# Patient Record
Sex: FEMALE | Race: BLACK OR AFRICAN AMERICAN
[De-identification: names, ages, dates, MRNs, and addresses within clinical notes are randomized per-mention and may not be internally consistent; named-entity substitution may affect disease eponyms.]

---

## 2017-03-05 ENCOUNTER — HOSPITAL ENCOUNTER (EMERGENCY)
Dept: HOSPITAL 62 - ER | Age: 27
Discharge: LEFT BEFORE DISCHARGE | End: 2017-03-05
Payer: MEDICAID

## 2017-03-05 VITALS — SYSTOLIC BLOOD PRESSURE: 146 MMHG | DIASTOLIC BLOOD PRESSURE: 82 MMHG

## 2017-03-05 DIAGNOSIS — Z90.49: ICD-10-CM

## 2017-03-05 DIAGNOSIS — F17.200: ICD-10-CM

## 2017-03-05 DIAGNOSIS — R51: Primary | ICD-10-CM

## 2017-03-05 DIAGNOSIS — Z85.41: ICD-10-CM

## 2017-03-05 PROCEDURE — 87070 CULTURE OTHR SPECIMN AEROBIC: CPT

## 2017-03-05 PROCEDURE — 87880 STREP A ASSAY W/OPTIC: CPT

## 2017-03-05 PROCEDURE — 99281 EMR DPT VST MAYX REQ PHY/QHP: CPT

## 2017-03-05 PROCEDURE — 70450 CT HEAD/BRAIN W/O DYE: CPT

## 2017-03-05 NOTE — ER DOCUMENT REPORT
ED Medical Screen (RME)





- General


Chief Complaint: Sinus Congestion


Stated Complaint: SORE THROAT/HEAD PAIN


Time seen by provider: 15:33


Notes: 


Pt complains of fever, sinus congestion, and sore throat for several days.  Has 

noticed white spots to her tonsils.  States history of sinus infections.  Mom 

states she has a history of asthma, but has not used inhaler because she is 

unable to locate it.





I have greeted and performed a rapid initial assessment of this patient.  A 

comprehensive ED assessment and evaluation of the patient, analysis of test 

results and completion of the medical decision making process will be conducted 

by additional ED providers.


TRAVEL OUTSIDE OF THE [place] IN LAST 30 DAYS: No





- Related Data


Allergies/Adverse Reactions: 


 





No Known Allergies Allergy (Verified 03/05/17 15:31)


 











Past Medical History





- Social History


Chew tobacco use (# tins/day): No


Frequency of alcohol use: Occasional


Drug Abuse: None


Family history: Reviewed & Not Pertinent


Pulmonary Medical History: Reports: Hx Asthma


Neurological Medical History: Reports: Hx Migraine


Endocrine Medical History: Denies: Hx Diabetes Mellitus Type 1, Hx Diabetes 

Mellitus Type 2


Renal/ Medical History: Denies: Hx Peritoneal Dialysis


Malignancy Medical History: Reports: Hx Cervical Cancer - pre-cancerous cells 

identified on pap 3 years ago, did not follow up- HPV


GI Medical History: Reports: Hx Gastroesophageal Reflux Disease


Past Surgical History: Reports: Hx Cholecystectomy, Hx Herniorrhaphy, Hx Oral 

Surgery - wisdom teeth





- Immunizations


Hx Diphtheria, Pertussis, Tetanus Vaccination: Yes





Physical Exam





- HEENT


Pharynx: Erythema - mild erythema to throat, no airway compromise..  No: Exudate

## 2017-03-05 NOTE — ER DOCUMENT REPORT
ED General





- General


Chief Complaint: Sinus Congestion


Stated Complaint: SORE THROAT/HEAD PAIN


Information source: Patient


Notes: 


Patient is a 27-year-old female who states every season since she was around 8 

years old, 4 times a year, she develops a sinus infection.  She states when 

this occurs she has pain to her left I.  She denies any obvious blurry vision 

to that eye.  She denies any nausea, vomiting, or neck pain.  Patient states 

she was treated for this around 2 months ago at an outside center.  She does 

state some nasal congestion but denies any nasal discharge.  She denies any 

weakness or numbness to her arms or legs.


TRAVEL OUTSIDE OF THE [place] IN LAST 30 DAYS: No





- HPI


Onset: Other - See above


Onset/Duration: Gradual


Quality of pain: Achy


Severity: Mild


Associated symptoms: Other - See above


Exacerbated by: Denies


Relieved by: Denies


Similar symptoms previously: Yes


Recently seen / treated by doctor: Yes





- Related Data


Allergies/Adverse Reactions: 


 





No Known Allergies Allergy (Verified 03/05/17 15:31)


 











Past Medical History





- General


Information source: Patient





- Social History


Smoking Status: Current Every Day Smoker


Chew tobacco use (# tins/day): No


Frequency of alcohol use: Occasional


Drug Abuse: None


Family History: CAD, Hypertension, Malignancy, Thyroid Disfunction, Other


Patient has suicidal ideation: No


Patient has homicidal ideation: No


Pulmonary Medical History: Reports: Hx Asthma


Neurological Medical History: Reports: Hx Migraine


Endocrine Medical History: Denies: Hx Diabetes Mellitus Type 1, Hx Diabetes 

Mellitus Type 2


Renal/ Medical History: Denies: Hx Peritoneal Dialysis


Malignancy Medical History: Reports: Hx Cervical Cancer - pre-cancerous cells 

identified on pap 3 years ago, did not follow up- HPV


GI Medical History: Reports: Hx Gastroesophageal Reflux Disease


Past Surgical History: Reports: Hx Cholecystectomy, Hx Herniorrhaphy, Hx Oral 

Surgery - wisdom teeth





- Immunizations


Hx Diphtheria, Pertussis, Tetanus Vaccination: Yes





Review of Systems





- Review of Systems


Constitutional: denies: Fever


EENT: Nose congestion, Throat pain.  denies: Eye discharge


Respiratory: denies: Short of breath


Gastrointestinal: denies: Vomiting


Musculoskeletal: denies: Leg swelling


Skin: Other - no hives.  denies: Rash


Neurological/Psychological: Other - no slurred speech.  denies: Confusion, 

Hallucinations, Sensory change, Weakness, Loss of power, Seizure, Lost 

consciousness, Speech impairment, Numbness


-: Yes All other systems reviewed and negative





Physical Exam





- Vital signs


Vitals: 


 











Temp Pulse Resp BP Pulse Ox


 


 99.0 F   100   17   146/82 H  98 


 


 03/05/17 15:30  03/05/17 15:30  03/05/17 15:30  03/05/17 15:30  03/05/17 15:30











Notes: 


Reviewed vital signs and nursing note as charted by RN.





CONSTITUTIONAL: Alert and oriented and responds appropriately to questions. Well

-appearing; well-nourished





HEAD: Normocephalic; atraumatic





EYES: PERRL; using the ophthalmoscope the patient has clear vessels to the left 

eye with a circular sharp disc.  





ENT: Normal nose; no rhinorrhea; moist mucous membranes; pharynx without 

lesions noted





NECK: Supple without meningismus; non-tender; no cervical lymphadenopathy, no 

masses





CARD: Regular rate and rhythm; no murmurs, no clicks, no rubs, no gallops; 

symmetric distal pulses





RESP: Normal chest excursion without splinting or tachypnea; breath sounds 

clear and equal bilaterally; no wheezes, no rhonchi, no rales





EXT: Normal ROM in all joints; non-tender to palpation; no cyanosis, no 

effusions, no edema





SKIN: Normal color for age and race; warm; dry; good turgor; capillary refill < 

2 seconds; no acute lesions noted





NEURO: CN II through XII are intact.  Patient has 5 out of 5 bilateral upper 

and lower extremity strength with sensation intact to light touch.





PSYCH: The patient's mood and manner are appropriate. Grooming and personal 

hygiene are appropriate.





Course





- Re-evaluation


Re-evalutation: 


03/05/17 17:30


Given the history and physical examination with system extensive course, I will 

perform a CT scan of the head as the patient states she has never received 

imaging prior.  I will also check the patient's intraocular pressure with a Orville

-Pen given the frequency of the symptoms.  Patient states she has tried allergy 

medications that have been ineffective.





03/05/17 18:15


CT scan shows no acute intracranial pathology.  Patient still has no focal 

neurological deficits.  Prior to being able to check the patient's Orville-Pen 

pressure which I was attempting to take, the patient left with her baby and her 

daughter.  She did receive the prescription for the antibiotics for her 

daughter.





- Vital Signs


Vital signs: 


 











Temp Pulse Resp BP Pulse Ox


 


 99.0 F   100   17   146/82 H  98 


 


 03/05/17 15:30  03/05/17 15:30  03/05/17 15:30  03/05/17 15:30  03/05/17 15:30














Discharge





- Discharge


Clinical Impression: 


 Frontal headache





Condition: Fair


Disposition: ELOPED

## 2017-04-07 ENCOUNTER — HOSPITAL ENCOUNTER (EMERGENCY)
Dept: HOSPITAL 62 - ER | Age: 27
Discharge: HOME | End: 2017-04-07
Payer: MEDICAID

## 2017-04-07 VITALS — DIASTOLIC BLOOD PRESSURE: 79 MMHG | SYSTOLIC BLOOD PRESSURE: 140 MMHG

## 2017-04-07 DIAGNOSIS — R59.0: Primary | ICD-10-CM

## 2017-04-07 DIAGNOSIS — F17.210: ICD-10-CM

## 2017-04-07 DIAGNOSIS — R03.0: ICD-10-CM

## 2017-04-07 DIAGNOSIS — K13.70: ICD-10-CM

## 2017-04-07 DIAGNOSIS — K21.9: ICD-10-CM

## 2017-04-07 DIAGNOSIS — Z90.49: ICD-10-CM

## 2017-04-07 PROCEDURE — 99283 EMERGENCY DEPT VISIT LOW MDM: CPT

## 2017-04-07 NOTE — ER DOCUMENT REPORT
HPI





- HPI


Patient complains to provider of: left cheek irritation


Onset: Other - 2 days


Onset/Duration: Persistent


Quality of pain: Achy


Severity: Moderate


Pain Level: 3


Context: 


Patient presents emergency department with reports of left cheek irritation for 

the past 2 days with a lymph node swelling.  Denies bites, fever vomiting 

diarrhea.  Reports no history of MRSA.  Patient is wondering if it is a spider 

bite.


Associated Symptoms: None


Exacerbated by: Denies


Relieved by: Denies


Similar symptoms previously: No


Recently seen / treated by doctor: No





- REPRODUCTIVE


LMP: 3-27-17


Reproductive: REPORTS: Pregnant:





- DERM


Skin Color: Normal





Past Medical History





- General


Information source: Patient





- Social History


Smoking Status: Current Every Day Smoker


Cigarette use (# per day): Yes


Drug Abuse: None


Family History: CAD, Hypertension, Malignancy, Thyroid Disfunction, Other


Patient has suicidal ideation: No


Patient has homicidal ideation: No


Pulmonary Medical History: Reports: Hx Asthma


Neurological Medical History: Reports: Hx Migraine


Endocrine Medical History: Denies: Hx Diabetes Mellitus Type 1, Hx Diabetes 

Mellitus Type 2


Renal/ Medical History: Denies: Hx Peritoneal Dialysis


Malignancy Medical History: Reports: Hx Cervical Cancer - pre-cancerous cells 

identified on pap 3 years ago, did not follow up- HPV


GI Medical History: Reports: Hx Gastroesophageal Reflux Disease


Past Surgical History: Reports: Hx Cholecystectomy, Hx Herniorrhaphy, Hx Oral 

Surgery - wisdom teeth





- Immunizations


Hx Diphtheria, Pertussis, Tetanus Vaccination: Yes





Vertical Provider Document





- CONSTITUTIONAL


Agree With Documented VS: Yes


Exam Limitations: No Limitations


General Appearance: WD/WN, No Apparent Distress





- INFECTION CONTROL


TRAVEL OUTSIDE OF THE [place] IN LAST 30 DAYS: No





- HEENT


HEENT: Atraumatic, Normocephalic.  negative: Conjuctival Injection, Pharyngeal 

Erythema


Notes: 


Left cheek with slight redness to the center, no induration no swelling no 

warmth no discharge no pustules/no vesicles, no signs or symptoms of an abscess 

opens mouth wide without problems clear speech





- NECK


Neck: Normal Inspection, Supple, Lymphadenopathy-Left - left cervical lymph node

, shotty





- RESPIRATORY


Respiratory: Breath Sounds Normal, No Respiratory Distress


O2 Sat by Pulse Oximetry: 99





- CARDIOVASCULAR


Cardiovascular: Regular Rate, Regular Rhythm





- GI/ABDOMEN


Gastrointestinal: Abdomen Soft, Abdomen Non-Tender





- MUSCULOSKELETAL/EXTREMETIES


Musculoskeletal/Extremeties: MAEW, FROM





- NEURO


Level of Consciousness: Awake, Alert, Appropriate


Motor/Sensory: No Motor Deficit





- DERM


Integumentary: Warm, Dry


Adult Front & Back Diagram: 


  __________________________














  __________________________





 1 - left cheek irritation





 2 - left lymph node shotty








Course





- Re-evaluation


Re-evalutation: 





04/08/17 


She was instructed on care of the skin irritation.  She was also instructed on 

lymph node swelling.  She was instructed to return emergency department if it 

worsens or turns into an abscess she verbalized understanding to all 

instructions.





- Vital Signs


Vital signs: 


 











Temp Pulse Resp BP Pulse Ox


 


 98.5 F   90   18   140/79 H  99 


 


 04/07/17 20:09  04/07/17 20:09  04/07/17 20:09  04/07/17 20:09  04/07/17 20:09














Discharge





- Discharge


Clinical Impression: 


 left cheek irritation, Left cervical lymphadenopathy, Elevated blood pressure 

reading





Condition: Stable


Disposition: HOME, SELF-CARE


Instructions:  Lymphadenopathy ([facility])


Additional Instructions: 


*You have been evaluated for suspected spider bite, swollen lymph node, 

elevated blood pressure reading


*Monitor the lymph node as discussed, follow up with a primary care provider or 

return to the ED if the lymph node increases in size or if it is not any better 

within one week


*Keep your cheek clean, monitor for signs of infection such as swelling, warmth

, drainage, increased pain


*Follow up with a primary care provider within one week for recheck


*Return to ED for worsening condition, changes, needs








Monitor your blood pressure. Your blood pressure was elevated today.  This may 

be because you were anxious, in pain or because you need medication.  It is 

important to follow up with your primary care provider for full evaluation.


 





Forms:  Return to Work


Referrals: 


MARY ANN LARA MD [Primary Care Provider] - Follow up in 1 week

## 2017-07-25 ENCOUNTER — HOSPITAL ENCOUNTER (OUTPATIENT)
Dept: HOSPITAL 62 - OD | Age: 27
End: 2017-07-25
Attending: NURSE PRACTITIONER
Payer: MEDICAID

## 2017-07-25 DIAGNOSIS — R10.30: Primary | ICD-10-CM

## 2017-07-25 PROCEDURE — 87086 URINE CULTURE/COLONY COUNT: CPT

## 2017-11-13 ENCOUNTER — HOSPITAL ENCOUNTER (EMERGENCY)
Dept: HOSPITAL 62 - ER | Age: 27
Discharge: HOME | End: 2017-11-13
Payer: COMMERCIAL

## 2017-11-13 VITALS — SYSTOLIC BLOOD PRESSURE: 148 MMHG | DIASTOLIC BLOOD PRESSURE: 85 MMHG
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
## 2017-11-13 DIAGNOSIS — F17.200: ICD-10-CM

## 2017-11-13 DIAGNOSIS — J45.909: ICD-10-CM

## 2017-11-13 DIAGNOSIS — M25.511: Primary | ICD-10-CM

## 2017-11-13 DIAGNOSIS — M62.830: ICD-10-CM

## 2017-11-13 PROCEDURE — 99283 EMERGENCY DEPT VISIT LOW MDM: CPT

## 2017-11-13 PROCEDURE — 73030 X-RAY EXAM OF SHOULDER: CPT

## 2017-11-13 PROCEDURE — 96372 THER/PROPH/DIAG INJ SC/IM: CPT

## 2017-11-13 NOTE — RADIOLOGY REPORT (SQ)
EXAM DESCRIPTION:  SHOULDER RIGHT 2 OR MORE VIEWS



COMPLETED DATE/TIME:  11/13/2017 6:48 pm



REASON FOR STUDY:  right shoulder pain



COMPARISON:  None.



NUMBER OF VIEWS:  Three views.



TECHNIQUE:  Internal rotation, external rotation, and Y view images acquired of the right shoulder.



LIMITATIONS:  None.



FINDINGS:  MINERALIZATION: Normal.

BONES: No acute fracture or dislocation.  No worrisome bone lesions.

JOINTS: No dislocation.

VISUALIZED LUNGS AND RIBS: No pneumothorax.  No rib fracture.

SOFT TISSUES: No radiopaque foreign body.

OTHER: No other significant finding.



IMPRESSION:  NEGATIVE STUDY OF THE RIGHT SHOULDER. NO RADIOGRAPHIC EVIDENCE OF ACUTE INJURY.



TECHNICAL DOCUMENTATION:  JOB ID:  [id number]

 2011 [employer]- All Rights Reserved
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
## 2017-11-15 ENCOUNTER — HOSPITAL ENCOUNTER (EMERGENCY)
Dept: HOSPITAL 62 - ER | Age: 27
Discharge: HOME | End: 2017-11-15
Payer: COMMERCIAL

## 2017-11-15 VITALS — SYSTOLIC BLOOD PRESSURE: 136 MMHG | DIASTOLIC BLOOD PRESSURE: 81 MMHG

## 2017-11-15 DIAGNOSIS — F17.200: ICD-10-CM

## 2017-11-15 DIAGNOSIS — S16.1XXA: Primary | ICD-10-CM

## 2017-11-15 DIAGNOSIS — M25.511: ICD-10-CM
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
## 2017-11-15 DIAGNOSIS — M54.2: ICD-10-CM

## 2017-11-15 PROCEDURE — 99283 EMERGENCY DEPT VISIT LOW MDM: CPT
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
## 2018-01-26 ENCOUNTER — HOSPITAL ENCOUNTER (OUTPATIENT)
Dept: HOSPITAL 62 - LAB | Age: 28
End: 2018-01-26
Attending: NURSE PRACTITIONER
Payer: MEDICAID

## 2018-01-26 DIAGNOSIS — R10.9: Primary | ICD-10-CM

## 2018-01-26 LAB
BACTERIA (WET MOUNT): (no result)
CHLAM PCR: NOT DETECTED
EPITHELIALS (WET MOUNT): (no result)
GON PCR: NOT DETECTED
RBCS (WET MOUNT): (no result)
T.VAGINALIS (WET MOUNT): (no result)
WBCS (WET MOUNT): (no result)
YEAST (WET MOUNT): (no result)

## 2018-01-26 PROCEDURE — 87591 N.GONORRHOEAE DNA AMP PROB: CPT

## 2018-01-26 PROCEDURE — 87210 SMEAR WET MOUNT SALINE/INK: CPT

## 2018-01-26 PROCEDURE — 87086 URINE CULTURE/COLONY COUNT: CPT

## 2018-01-26 PROCEDURE — 87491 CHLMYD TRACH DNA AMP PROBE: CPT

## 2018-01-29 ENCOUNTER — HOSPITAL ENCOUNTER (EMERGENCY)
Dept: HOSPITAL 62 - ER | Age: 28
Discharge: HOME | End: 2018-01-29
Payer: MEDICAID

## 2018-01-29 VITALS — DIASTOLIC BLOOD PRESSURE: 78 MMHG | SYSTOLIC BLOOD PRESSURE: 129 MMHG

## 2018-01-29 DIAGNOSIS — R11.0: ICD-10-CM

## 2018-01-29 DIAGNOSIS — R52: ICD-10-CM

## 2018-01-29 DIAGNOSIS — R05: ICD-10-CM

## 2018-01-29 DIAGNOSIS — J45.909: ICD-10-CM

## 2018-01-29 DIAGNOSIS — F17.200: ICD-10-CM

## 2018-01-29 DIAGNOSIS — R09.81: ICD-10-CM

## 2018-01-29 DIAGNOSIS — J34.89: ICD-10-CM

## 2018-01-29 DIAGNOSIS — J06.9: Primary | ICD-10-CM

## 2018-01-29 DIAGNOSIS — B97.89: ICD-10-CM

## 2018-01-29 DIAGNOSIS — N30.90: ICD-10-CM

## 2018-01-29 PROCEDURE — 99283 EMERGENCY DEPT VISIT LOW MDM: CPT

## 2018-01-29 NOTE — ER DOCUMENT REPORT
ED Flu Like





- General


Mode of Arrival: Ambulatory


Information source: Patient


TRAVEL OUTSIDE OF THE [place] IN LAST 30 DAYS: No





- HPI


Onset: Last week





- General


Chief Complaint: Flu Symptoms


Stated Complaint: COUGH/FEVER


Time Seen by Provider: 01/29/18 09:22


Notes: 


Patient is a 28 year old female presenting to the emergency department 

complaining of flu like symptoms including body aches, coughing, rhinorrhea and 

nausea onset 2 days ago. 





Patient states she has recently been diagnosed with a bladder infection and is 

currently taking Metronidazole and Doxycycline. Patient denies vomiting or 

diarrhea. 


 (JIHAN HURTADO)





- Related Data


Allergies/Adverse Reactions: 


 





No Known Allergies Allergy (Verified 11/15/17 11:46)


 











Past Medical History





- General


Information source: Patient





- Social History


Smoking Status: Current Every Day Smoker


Chew tobacco use (# tins/day): No


Frequency of alcohol use: None


Drug Abuse: None


Family History: CAD, Hypertension, Malignancy, Thyroid Disfunction, Other


Patient has suicidal ideation: No


Patient has homicidal ideation: No


Pulmonary Medical History: Reports: Hx Asthma


Neurological Medical History: Reports: Hx Migraine


Malignancy Medical History: Reports: Hx Cervical Cancer - pre-cancerous cells 

identified on pap 3 years ago, did not follow up- HPV


GI Medical History: Reports: Hx Gastroesophageal Reflux Disease


Past Surgical History: Reports: Hx Cholecystectomy, Hx Herniorrhaphy, Hx Oral 

Surgery - wisdom teeth





- Immunizations


Hx Diphtheria, Pertussis, Tetanus Vaccination: Yes





Review of Systems





- Review of Systems


Constitutional: No symptoms reported


EENT: See HPI, Nose congestion


Cardiovascular: No symptoms reported


Respiratory: See HPI, Cough


Gastrointestinal: See HPI, Nausea


Genitourinary: No symptoms reported


Female Genitourinary: No symptoms reported


Musculoskeletal: No symptoms reported


Skin: No symptoms reported


Hematologic/Lymphatic: No symptoms reported


Neurological/Psychological: No symptoms reported


-: Yes All other systems reviewed and negative





Physical Exam





- Vital signs


Vitals: 


 











Temp Pulse Resp BP Pulse Ox


 


 99.4 F   101 H  15   138/80 H  99 


 


 01/29/18 09:05  01/29/18 09:05  01/29/18 09:05  01/29/18 09:05  01/29/18 09:05














- Notes


Notes: 





GENERAL: Alert, interacts well. No acute distress.


HEAD: Normocephalic, atraumatic.


EYES: Pupils equal, round, and reactive to light. Extraocular movements intact.


ENT: Oral mucosa moist, tongue midline. No erythema of the posterior 

oropharynx. 


NECK: Full range of motion. Supple. Trachea midline.


LUNGS: Clear to auscultation bilaterally, no wheezes, rales, or rhonchi. No 

respiratory distress.


HEART: Regular rate and rhythm. No murmurs, gallops, or rubs.


ABDOMEN: Soft, non-tender. Non-distended. Bowel sounds present in all 4 

quadrants.


EXTREMITIES: Moves all 4 extremities spontaneously. 


NEUROLOGICAL: Alert and oriented x3. Normal speech.


PSYCH: Normal affect, normal mood.


SKIN: Warm, dry, normal turgor. No rashes or lesions noted.


 (JIHAN HURTADO)





Course





- Re-evaluation


Re-evalutation: 





01/29/18 10:11


Patient well-appearing in no acute distress.  She is already on antibiotics for 

urinary tract infection.  Instructed to continue antibiotics and obtain Advil 

Cold and Sinus from over-the-counter.  Return precautions provided including 

any worsening of symptoms or not improving to be reevaluated in the next 2-3 

days. (NANCY GALEAS)





- Vital Signs


Vital signs: 


 











Temp Pulse Resp BP Pulse Ox


 


 97.5 F   88   15   129/78 H  100 


 


 01/29/18 10:25  01/29/18 10:25  01/29/18 09:05  01/29/18 10:25  01/29/18 10:25














Discharge





- Discharge


Clinical Impression: 


Upper respiratory infection


Qualifiers:


 URI type: unspecified viral URI Qualified Code(s): J06.9 - Acute upper 

respiratory infection, unspecified





Disposition: HOME, SELF-CARE


Additional Instructions: 


Please purchase Advil Cold and Sinus over-the-counter intake as instructed for 

your upper respiratory symptoms


Forms:  Return to Work


Referrals: 


MARY ANN LARA MD [Primary Care Provider] - Follow up as needed


Scribe Attestation: 





01/30/18 20:06


I personally performed the services described documentation, reviewed and 

edited the documentation which was dictated to describe my presence, and it 

accurately records my words and actions. (NANCY GALEAS)





Scribe Documentation





- Scribe


Written by Scribe:: Guanaco Hurst, 1/29/2018 09:55


acting as scribe for :: Agustin

## 2018-06-05 ENCOUNTER — HOSPITAL ENCOUNTER (OUTPATIENT)
Dept: HOSPITAL 62 - RAD | Age: 28
End: 2018-06-05
Attending: INTERNAL MEDICINE
Payer: SELF-PAY

## 2018-06-05 DIAGNOSIS — M25.561: Primary | ICD-10-CM

## 2018-06-05 NOTE — RADIOLOGY REPORT (SQ)
EXAM DESCRIPTION:  KNEE RIGHT 2 VIEWS



COMPLETED DATE/TIME:  6/5/2018 12:22 pm



REASON FOR STUDY:  PAIN IN RIGHT KNEE M25.561  PAIN IN RIGHT KNEE



COMPARISON:  None.



NUMBER OF VIEWS:  Two views.



TECHNIQUE:  AP and lateral radiographic images acquired of the right knee.



LIMITATIONS:  None.



FINDINGS:  MINERALIZATION: Normal.

BONES: No acute fracture or dislocation. No worrisome bone lesions. No significant osteophytes.

JOINT: No effusion.  No chondrocalcinosis.

OTHER: No other significant finding.



IMPRESSION:  NEGATIVE STUDY OF THE RIGHT KNEE. NO EXPLANATION FOR PAIN.



TECHNICAL DOCUMENTATION:  JOB ID:  [id number]

 2011 [employer]- All Rights Reserved



Reading location - IP/workstation name: [email or web address]

## 2018-06-08 ENCOUNTER — HOSPITAL ENCOUNTER (EMERGENCY)
Dept: HOSPITAL 62 - ER | Age: 28
Discharge: HOME | End: 2018-06-08
Payer: COMMERCIAL

## 2018-06-08 VITALS — SYSTOLIC BLOOD PRESSURE: 121 MMHG | DIASTOLIC BLOOD PRESSURE: 74 MMHG

## 2018-06-08 DIAGNOSIS — Z85.41: ICD-10-CM

## 2018-06-08 DIAGNOSIS — M79.604: Primary | ICD-10-CM

## 2018-06-08 DIAGNOSIS — F17.210: ICD-10-CM

## 2018-06-08 DIAGNOSIS — Z90.49: ICD-10-CM

## 2018-06-08 DIAGNOSIS — M25.561: ICD-10-CM

## 2018-06-08 PROCEDURE — 99284 EMERGENCY DEPT VISIT MOD MDM: CPT

## 2018-06-08 PROCEDURE — 72110 X-RAY EXAM L-2 SPINE 4/>VWS: CPT

## 2018-06-08 PROCEDURE — 82962 GLUCOSE BLOOD TEST: CPT

## 2018-06-08 PROCEDURE — 99406 BEHAV CHNG SMOKING 3-10 MIN: CPT

## 2018-06-08 PROCEDURE — 96372 THER/PROPH/DIAG INJ SC/IM: CPT

## 2018-06-08 PROCEDURE — 73502 X-RAY EXAM HIP UNI 2-3 VIEWS: CPT

## 2018-06-08 NOTE — RADIOLOGY REPORT (SQ)
EXAM DESCRIPTION:  L SPINE WHOLE



COMPLETED DATE/TIME:  6/8/2018 2:12 pm



REASON FOR STUDY:  pain from hip to foot getting worse



COMPARISON:  None.



NUMBER OF VIEWS:  Five views including obliques.



TECHNIQUE:  AP, lateral, oblique, and sacral radiographic images acquired of the lumbar spine.



LIMITATIONS:  None.



FINDINGS:  MINERALIZATION: Normal.

SEGMENTATION: Normal.  No transitional anatomy.

ALIGNMENT: Normal.

VERTEBRAE: Maintained height.  No fracture or worrisome bone lesion.

DISCS: Preserved height.  No significant osteophytes or end plate irregularity.

POSTERIOR ELEMENTS: Pedicles and facets are intact.  No pars defect or posterior arch defects.

HARDWARE: None in the spine.

PARASPINAL SOFT TISSUES: Normal.

PELVIS: Intact as visualized. No fractures or worrisome bone lesions. SI joints intact.

OTHER: No other significant finding.



IMPRESSION:  NORMAL 5 VIEW LUMBAR SPINE.



TECHNICAL DOCUMENTATION:  JOB ID:  [id number]

 2011 [employer]- All Rights Reserved



Reading location - IP/workstation name: [facility]

## 2018-06-08 NOTE — RADIOLOGY REPORT (SQ)
EXAM DESCRIPTION:  HIP RIGHT AP/LATERAL



COMPLETED DATE/TIME:  6/8/2018 2:12 pm



REASON FOR STUDY:  pain from hip to foot getting worse



COMPARISON:  None.



NUMBER OF VIEWS:  Two views.



TECHNIQUE:  AP pelvis and additional frog-leg view of the right hip.



LIMITATIONS:  None.



FINDINGS:  MINERALIZATION: Normal.

RIGHT HIP: No fracture or dislocation.  No worrisome bone lesions. No contour deformity.  No joint sp
ace narrowing.

LEFT HIP: No fracture or dislocation.  No worrisome bone lesions.

PUBIS AND ISCHIUM: No fracture.

PELVIS: No fracture.

SACRUM: No fracture or dislocation. No worrisome bone lesions.

LOWER LUMBAR SPINE: No fracture or dislocation. No worrisome bone lesions.  No significant disc disea
se.

SOFT TISSUES: No findings.

OTHER: No other significant finding.



IMPRESSION:  NEGATIVE STUDY OF THE RIGHT HIP. NO EXPLANATION FOR PAIN.



TECHNICAL DOCUMENTATION:  JOB ID:  [id number]

 2011 [employer]- All Rights Reserved



Reading location - IP/workstation name: [email or web address]

## 2018-06-08 NOTE — ER DOCUMENT REPORT
ED Extremity Problem, Lower





- General


Chief Complaint: Leg Pain


Stated Complaint: LEG PAIN


Time Seen by Provider: 06/08/18 13:09


Mode of Arrival: Ambulatory


Information source: Patient


Notes: 





28-year-old female presents to ED for right knee pain times a year.  She states 

she has been to Dr. Powers and he said that the x-rays did not show any 

source of the pain but he only x-rayed the knee.  He states that Dr. Powers 

would recommend an MRI but she has not had it yet and the pain is continuing to 

educate stand it anymore.  He states he also tolerated with good check for 

thyroid and diabetes but she has not been told any results yet.  I have 

discussed risks with patient and we will get a x-ray of the lumbar spine right 

hip to ensure that that is not the source of her pain because all the way from 

her hip to her foot we will also do an Accu-Chek to ensure that there is no 

elevation and blood sugars so that I can give her a Decadron and Toradol 

injection.  Patient was agreeable to this plan.


TRAVEL OUTSIDE OF THE [place] IN LAST 30 DAYS: No





- HPI


Patient complains to provider of: Pain


Location: Ankle, Hip, Knee, Leg, Thigh


Occurred: Other - A year


Onset/Duration: Persistent


Quality of pain: Burning, Cramping, Sharp


Severity: Severe


Pain Level: 5


Recent injury: No


Associated symptoms: Painful ambulation


Exacerbated by: Hanging down, Movement, Walking


Relieved by: Elevation





- Related Data


Allergies/Adverse Reactions: 


 





No Known Allergies Allergy (Verified 06/08/18 12:55)


 











Past Medical History





- General


Information source: Patient





- Social History


Smoking Status: Current Every Day Smoker


Cigarette use (# per day): Yes - 1/2 ppd


Chew tobacco use (# tins/day): No


Smoking Education Provided: Yes - 4 min


Frequency of alcohol use: None


Drug Abuse: None


Occupation: [employer]


Lives with: Alone


Family History: CAD, Hypertension, Malignancy, Thyroid Disfunction, Other


Patient has suicidal ideation: No


Patient has homicidal ideation: No





- Past Medical History


Cardiac Medical History: Reports: None


Pulmonary Medical History: Reports: Hx Asthma


EENT Medical History: Reports: None


Neurological Medical History: Reports: Hx Migraine


Endocrine Medical History: Reports: None


Renal/ Medical History: Reports: None


Malignancy Medical History: Reports: Hx Cervical Cancer - pre-cancerous cells 

identified on pap 3 years ago, did not follow up- HPV


GI Medical History: Reports: Hx Gastroesophageal Reflux Disease


Musculoskeltal Medical History: Reports Hx Musculoskeletal Deformity


Skin Medical History: Reports None


Psychiatric Medical History: Reports: None


Traumatic Medical History: Reports: None


Infectious Medical History: Reports: None


Past Surgical History: Reports: Hx Cholecystectomy, Hx Herniorrhaphy, Hx Oral 

Surgery - wisdom teeth





- Immunizations


Hx Diphtheria, Pertussis, Tetanus Vaccination: Yes





Review of Systems





- Review of Systems


Constitutional: No symptoms reported


EENT: No symptoms reported


Cardiovascular: No symptoms reported


Respiratory: No symptoms reported


Gastrointestinal: No symptoms reported


Genitourinary: No symptoms reported


Female Genitourinary: No symptoms reported


Musculoskeletal: Other - Right leg pain from hip to foot


Skin: No symptoms reported


Hematologic/Lymphatic: No symptoms reported


Neurological/Psychological: No symptoms reported


-: Yes All other systems reviewed and negative





Physical Exam





- Vital signs


Vitals: 


 











Temp Pulse Resp BP Pulse Ox


 


 99 F   92   16   130/73 H  99 


 


 06/08/18 13:04  06/08/18 13:04  06/08/18 13:04  06/08/18 13:04  06/08/18 13:04











Interpretation: Normal





- General


General appearance: Appears well, Alert





- HEENT


Head: Normocephalic, Atraumatic


Eyes: Normal


Pupils: PERRL





- Respiratory


Respiratory status: No respiratory distress


Chest status: Nontender


Breath sounds: Normal


Chest palpation: Normal





- Cardiovascular


Rhythm: Regular


Heart sounds: Normal auscultation


Murmur: No





- Abdominal


Inspection: Normal


Distension: No distension


Bowel sounds: Normal


Tenderness: Nontender


Organomegaly: No organomegaly





- Back


Back: Normal, Nontender





- Extremities


General upper extremity: Normal inspection, Nontender, Normal color, Normal ROM

, Normal temperature


General lower extremity: Normal inspection, Normal color, Normal ROM, Normal 

temperature, Normal weight bearing.  No: Ashwin's sign


Hip: Tender


Thigh: Tender


Knee: Tender


Calf: Tender


Ankle: Tender


Foot: Tender





- Neurological


Neuro grossly intact: Yes


Cognition: Normal


Orientation: AAOx4


[place] Coma Scale Eye Opening: Spontaneous


[place] Coma Scale Verbal: Oriented


Xu Coma Scale Motor: Obeys Commands


Xu Coma Scale Total: 15


Speech: Normal


Motor strength normal: LUE, RUE, LLE, RLE


Sensory: Normal





- Psychological


Associated symptoms: Normal affect, Normal mood





- Skin


Skin Temperature: Warm


Skin Moisture: Dry


Skin Color: Normal





Course





- Re-evaluation


Re-evalutation: 





06/08/18 15:04


Discussed x-rays with patient.  Patient was treated with Toradol and Decadron 

for her nerve type pain down her right leg.  X-rays were negative for any acute 

deformities.  Patient was instructed to follow-up with her primary doctor.  She 

requested and received crutches for her pain to the leg.


After performing a Medical Screening Examination, I estimate there is LOW risk 

for EXPANDING OR RUPTURED ABDOMINAL AORTIC ANEURYSM, CAUDA EQUINA SYNDROME, 

EPIDURAL MASS LESION, or HERNIATED DISK CAUSING SEVERE SPINAL STENOSIS, thus I 

consider the discharge disposition reasonable.  I have reevaluated this patient 

multiple times and no significant life threatening changes are noted. The 

patient  and I have discussed the diagnosis and risks, and we agree with 

discharging home and close follow-up. We also discussed returning to the 

Emergency Department immediately if new or worsening symptoms occur with the 

understanding that symptoms and presentations can change. We have discussed the 

symptoms which are most concerning (e.g., saddle anesthesia, urinary or bowel 

incontinence or retention, changing or worsening pain) that necessitate 

immediate return.





- Vital Signs


Vital signs: 


 











Temp Pulse Resp BP Pulse Ox


 


 99 F   92   16   130/73 H  99 


 


 06/08/18 13:04  06/08/18 13:04  06/08/18 13:04  06/08/18 13:04  06/08/18 13:04














- Diagnostic Test


Radiology reviewed: Image reviewed, Reports reviewed





Discharge





- Discharge


Clinical Impression: 


Leg pain


Qualifiers:


 Laterality: right Qualified Code(s): M79.604 - Pain in right leg





Condition: Stable


Disposition: HOME, SELF-CARE


Additional Instructions: 


Leg Pain, Nonspecific





     We did not find an obvious cause for your leg pain. There's no sign of 

blood clot, infection, or other serious disease.


     Possible causes of vague leg pain include muscle or joint inflammation, 

disc disease in the lower back, pressure on the nerves in the back, or reduced 

blood flow through the arteries of the leg.


     Rest the leg. Pain can be eased with an antiinflammatory pain medicine 

such as ibuprofen. If the pain involves a small area, a heating pad might help. 


     Call the doctor or return if the leg becomes swollen, weak, discolored, or 

increasingly painful, or if you develop any other significant change in your 

health.





Ibuprofen





     Ibuprofen is an excellent, safe drug for pain control.  In addition, it 

has potent antiinflammatory effects which are beneficial, especially in the 

treatment of injuries, arthritis, or tendonitis. It's best to take ibuprofen 

with food.  Persons with ulcer disease or allergy to aspirin should notify 

their physician of this before taking ibuprofen.


     Take the medication exactly as prescribed.  Don't take additional doses 

unless instructed to do so by your doctor.  If you develop wheezing, shortness 

of breath, hives, faintness, stomach pain, vomiting, or dark black stools, 

return for re-evaluation at once.





Acetaminophen





     Acetaminophen may be taken for pain relief or fever control. It's much 

safer than aspirin, offering a wider range of "safe" dosages.  It is safe 

during pregnancy.  Some brand names are Tylenol, Panadol, Datril, Anacin 3, 

Tempra, and Liquiprin. Acetaminophen can be repeated every four hours.  The 

following are maximum recommended dosages:





WEIGHT         Dose             Drops                  Elixir        Chewable(

80mg)


(LBS.)                            drprs=droppers    tsp=teaspoon


6                 40 mg            .4 ml (1/2)


6-11            80 mg            .8 ml (full)            1/2   tsp           1 

      tab


12-16         120 mg           1 1/2 drprs            3/4   tsp           1 1/2

  tabs


17-23         160 mg             2  drprs              1      tsp           2  

     tabs


24-30         240 mg             3  drprs              1 1/2 tsp           3   

    tabs


30-35         320 mg                                     2       tsp           

4       tabs


36-41         360 mg                                     2 1/4 tsp           4 1

/2  tabs


42-47         400 mg                                     2 1/2 tsp           5 

      tabs


48-53         480 mg                                     3       tsp          6

       tabs


54-59         520 mg                                     3  1/4 tsp          6 1

/2 tabs


60-64         560 mg                                     3  1/2 tsp          7 

     tabs 


65-70         600 mg                                     3  3/4 tsp          7 1

/2 tabs


71-76         640 mg                                     4       tsp           

8      tabs


77-82         720 mg                                     4 1/2  tsp           9

      tabs


83-88         800 mg                                     5       tsp           

10     tabs





>89 pounds or adults          650 mg to 900 mg 





Acetaminophen can be repeated every four hours. Maximum daily dose not to 

exceed 4000 mg.





   These maximum recommended dosages are slightly higher than the dosages 

written on the product container, but these dosages are very safe and well 

below the toxic dosage for acetaminophen.








Ice & Elevation





     Apply ice packs frequently against the painful area.  Many different 

schedules are recommended, such as "20 minutes on, 20 minutes off" or "one hour 

ice, two hours rest."  If you need to work, you may need to go longer between 

ice treatments.  You should plan to have the area ice packed AT LEAST one-

fourth of the time.


     The ice should be applied over the wrap, tape, or splint, or over a layer 

of cloth -- not directly against the skin.  Some ice bags have a built-in cloth 

and can be put directly on the skin.


     Your injured part should be elevated as much as possible over the next 48 

hours.  Try to keep the injury above the level of the heart. Avoid use of the 

injured area.  Elevation and rest will decrease the swelling.





FOLLOW-UP CARE:


If you have been referred to a physician for follow-up care, call the physician

s office for an appointment as you were instructed or within the next two days.

  If you experience worsening or a significant change in your symptoms, notify 

the physician immediately or return to the Emergency Department at any time for 

re-evaluation.


Forms:  Elevated Blood Pressure, Smoking Cessation Education, Return to Work


Referrals: 


MARY ANN LARA MD [Primary Care Provider] - Follow up as needed

## 2019-02-17 ENCOUNTER — HOSPITAL ENCOUNTER (EMERGENCY)
Dept: HOSPITAL 62 - ER | Age: 29
Discharge: HOME | End: 2019-02-17
Payer: SELF-PAY

## 2019-02-17 VITALS — DIASTOLIC BLOOD PRESSURE: 80 MMHG | SYSTOLIC BLOOD PRESSURE: 140 MMHG

## 2019-02-17 DIAGNOSIS — O99.331: ICD-10-CM

## 2019-02-17 DIAGNOSIS — O46.91: Primary | ICD-10-CM

## 2019-02-17 DIAGNOSIS — J45.909: ICD-10-CM

## 2019-02-17 DIAGNOSIS — O99.511: ICD-10-CM

## 2019-02-17 DIAGNOSIS — Z3A.01: ICD-10-CM

## 2019-02-17 LAB
ADD MANUAL DIFF: NO
BASOPHILS # BLD AUTO: 0.1 10^3/UL (ref 0–0.2)
BASOPHILS NFR BLD AUTO: 0.7 % (ref 0–2)
EOSINOPHIL # BLD AUTO: 0.3 10^3/UL (ref 0–0.6)
EOSINOPHIL NFR BLD AUTO: 3.1 % (ref 0–6)
ERYTHROCYTE [DISTWIDTH] IN BLOOD BY AUTOMATED COUNT: 17 % (ref 11.5–14)
HCT VFR BLD CALC: 34.1 % (ref 36–47)
HGB BLD-MCNC: 11.2 G/DL (ref 12–15.5)
LYMPHOCYTES # BLD AUTO: 2.6 10^3/UL (ref 0.5–4.7)
LYMPHOCYTES NFR BLD AUTO: 27.3 % (ref 13–45)
MCH RBC QN AUTO: 25.5 PG (ref 27–33.4)
MCHC RBC AUTO-ENTMCNC: 33 G/DL (ref 32–36)
MCV RBC AUTO: 77 FL (ref 80–97)
MONOCYTES # BLD AUTO: 0.5 10^3/UL (ref 0.1–1.4)
MONOCYTES NFR BLD AUTO: 5.4 % (ref 3–13)
NEUTROPHILS # BLD AUTO: 6.1 10^3/UL (ref 1.7–8.2)
NEUTS SEG NFR BLD AUTO: 63.5 % (ref 42–78)
PLATELET # BLD: 297 10^3/UL (ref 150–450)
RBC # BLD AUTO: 4.42 10^6/UL (ref 3.72–5.28)
TOTAL CELLS COUNTED % (AUTO): 100 %
WBC # BLD AUTO: 9.6 10^3/UL (ref 4–10.5)

## 2019-02-17 PROCEDURE — 76817 TRANSVAGINAL US OBSTETRIC: CPT

## 2019-02-17 PROCEDURE — 36415 COLL VENOUS BLD VENIPUNCTURE: CPT

## 2019-02-17 PROCEDURE — 84703 CHORIONIC GONADOTROPIN ASSAY: CPT

## 2019-02-17 PROCEDURE — 84702 CHORIONIC GONADOTROPIN TEST: CPT

## 2019-02-17 PROCEDURE — 85025 COMPLETE CBC W/AUTO DIFF WBC: CPT

## 2019-02-17 PROCEDURE — 99284 EMERGENCY DEPT VISIT MOD MDM: CPT

## 2019-02-17 NOTE — ER DOCUMENT REPORT
ED General





- General


Chief Complaint: Vag Bleeding, +preg <12wks


Stated Complaint: ABDOMINAL PAIN


Time Seen by Provider: 19 20:51


Primary Care Provider: 


[facility] [Provider Group] - 19


Notes: 





Patient is a  29-year-old female who presents to the emergency department 

with a chief complaint of vaginal bleeding.  She found out she was pregnant 4 

days ago with a home pregnancy test.  Yesterday she started to have some 

spotting.  Today a few hours before arrival to the emergency department, she 

started to have some more spotting.  She denies any abdominal pain.  She is 

sexually active she denies any vaginal discharge other than blood.  Her last 

menstrual cycle was 4 weeks ago.


TRAVEL OUTSIDE OF THE [place] IN LAST 30 DAYS: No





- Related Data


Allergies/Adverse Reactions: 


                                        





No Known Allergies Allergy (Verified 18 12:55)


   











Past Medical History





- Social History


Smoking Status: Current Every Day Smoker


Frequency of alcohol use: Rare


Drug Abuse: None


Family History: CAD, Hypertension, Malignancy, Thyroid Disfunction, Other


Pulmonary Medical History: Reports: Hx Asthma


Neurological Medical History: Reports: Hx Migraine


Endocrine Medical History: Denies: Hx Diabetes Mellitus Type 1, Hx Diabetes Me

llitus Type 2


Renal/ Medical History: Denies: Hx Peritoneal Dialysis


Malignancy Medical History: Reports: Hx Cervical Cancer - pre-cancerous cells i

dentified on pap 3 years ago, did not follow up- HPV


GI Medical History: Reports: Hx Gastroesophageal Reflux Disease


Musculoskeletal Medical History: Reports Hx Musculoskeletal Deformity


Past Surgical History: Reports: Hx Cholecystectomy, Hx Herniorrhaphy, Hx Oral 

Surgery - wisdom teeth





- Immunizations


Hx Diphtheria, Pertussis, Tetanus Vaccination: Yes





Review of Systems





- Review of Systems


Notes: 





REVIEW OF SYSTEMS:





CONSTITUTIONAL :    Denies recent illness.  Denies recent unintentional weight 

loss.  Denies fever,  chills, or sweats. 


EENT: Denies eye, ear, throat, or mouth pain, discharge, or symptoms.  Denies 

nasal or sinus congestion.


CARDIOVASCULAR:  Denies chest pain.


RESPIRATORY: Denies shortness of breath, cough, congestion, difficulty 

breathing, or wheezing. 


GASTROINTESTINAL: Denies nausea, vomiting, and diarrhea.  Denies abdominal pain.

 Denies constipation.  Last BM: Today


GENITOURINARY:  Denies difficulty urinating, burning, blood in urine, urgency or

frequency.


FEMALE  GENITOURINARY: See HPI


MUSCULOSKELETAL:  Denies neck and back pain.  Denies joint pain or swelling.


SKIN:   Denies rash, itchiness, or lesions


HEMATOLOGIC :   Denies easy bruising or bleeding.


LYMPHATIC:  Denies swollen, painful, enlarged glands.


NEUROLOGICAL: Denies no numbness or tingling denies weakness.  Denies headache. 

Denies altered mental status.  Denies alteration in speech.


PSYCHIATRIC:  Denies stress, anxiety, alteration in sleep patterns, or 

depression.





All other systems reviewed and negative.





Physical Exam





- Vital signs


Vitals: 


                                        











Temp Pulse Resp BP Pulse Ox


 


 99.5 F   101 H  16   140/80 H  100 


 


 19 19:30  19 19:30  19 19:30  19 19:30  19 19:30














- Notes


Notes: 





PHYSICAL EXAMINATION:





GENERAL: Appears well, healthy, well-nourished, no acute distress. 





HEAD:  Normocephalic, atraumatic.





EYES:  PERRL, conjunctiva normal, all extraocular movements intact, sclera 

nonicteric





ENT:  Moist mucous membranes. 





NECK: Supple, no noticeable swelling, redness, rash.  Normal range of motion.





LUNGS: Equal breath sounds bilaterally and clear to auscultation.  No wheezes 

rales or rhonchi.





CARDIOVASCULAR: S1-S2, regular rate, regular rhythm.  Radial pulses 2+, normal.





ABDOMEN: Normoactive bowel sounds.  Soft, nontender,  no guarding, no rebound 

tenderness, and no masses palpated.





EXTREMITIES: Normal strength and range of motion, no pitting or edema.  No 

cyanosis. 





NEUROLOGICAL: Moves all extremities upon command.  Strength 5/5 in all 

extremities. 





PSYCH: Normal mood, normal affect.





SKIN: Warm, dry.  No rash, lesions, ulcerations noted.  Normal skin turgor.





Course





- Re-evaluation


Re-evalutation: 


Based off the patient's history and physical exam, I am have a very low 

suspicion of an ovarian torsion, ectopic pregnancy, PID, or any other life-

threatening pelvic etiology. The patient's ultrasound does not show any 

intrauterine uterine pregnancy at this time, most likely because she is only 4 

weeks pregnant.  Her hCG is positive and her quantitative hCG is 603.  I have 

discussed the lab results and the patient's ultrasound with the patient.  She 

will follow-up with [facility] in 48-72 hours to check her 

hCG levels.  She is O+, no indication for RhoGam.  Verbal discharge instructions

were given to the patient.  They verbalized understanding.  They are stable for 

discharge.























- Vital Signs


Vital signs: 


                                        











Temp Pulse Resp BP Pulse Ox


 


 99.5 F   101 H  16   140/80 H  100 


 


 19 19:30  19 19:30  19 19:30  19 19:30  19 19:30














- Laboratory


Result Diagrams: 


                                 19 21:15





Laboratory results interpreted by me: 


                                        











  19





  21:15 21:15 21:15


 


Hgb  11.2 L  


 


Hct  34.1 L  


 


MCV  77 L  


 


MCH  25.5 L  


 


RDW  17.0 H  


 


Serum HCG, Qual   POSITIVE H 


 


Beta HCG, Quant    603.38 H














Discharge





- Discharge


Clinical Impression: 


 Vaginal bleeding during pregnancy





Condition: Stable


Disposition: HOME, SELF-CARE


Additional Instructions: 


You were seen today in the emergency department for vaginal bleeding while 

pregnant.  Your labs show that you are still pregnant.  Please follow-up with 

[facility] in 48-72 hours to have your labs redrawn.  You are

to be on strict pelvic precautions, meaning no sex or anything in your vagina 

until you are cleared by the OB/GYN.  If you have worsening bleeding, develop a 

fever greater than 100.4 F, have abdominal pain, or have any symptoms that are 

worrisome to you, please return to the emergency department.


Referrals: 


[facility] [Provider [facility]] - 19

## 2019-02-17 NOTE — RADIOLOGY REPORT (SQ)
EXAM DESCRIPTION:

US PREGNANCY TRANSVAGINAL



COMPLETED DATE/TME:  02/17/2019 20:58



CLINICAL HISTORY:

29 years Female, vaginal bleeding; 4 wks pregnant 



COMPARISON:

None



TECHNIQUE: Transvaginal.



LIMITATIONS: None.



FINDINGS:



No definite intrauterine gestation confirmed. Possible 0.4 cm

intrauterine gestational sac/yolk sac which of viable measures

five weeks and one day based on mean sac diameter of 0.4 cm.



4.3 cm right ovary, 2.6 cm left ovary, no significant free fluid,

possible 1.2 cm right corpus luteal cyst. 



IMPRESSION:



No intrauterine pregnancy confirmed. Differential diagnosis

includes early viable intrauterine gestation, gestational loss,

and occult ECTOPIC gestation. Recommend 48 -72 hours

laboratory/sonographic surveillance.

## 2019-02-18 ENCOUNTER — HOSPITAL ENCOUNTER (EMERGENCY)
Dept: HOSPITAL 62 - ER | Age: 29
Discharge: HOME | End: 2019-02-18
Payer: COMMERCIAL

## 2019-02-18 VITALS — SYSTOLIC BLOOD PRESSURE: 136 MMHG | DIASTOLIC BLOOD PRESSURE: 81 MMHG

## 2019-02-18 DIAGNOSIS — O99.331: ICD-10-CM

## 2019-02-18 DIAGNOSIS — R10.2: ICD-10-CM

## 2019-02-18 DIAGNOSIS — F17.210: ICD-10-CM

## 2019-02-18 DIAGNOSIS — Z3A.00: ICD-10-CM

## 2019-02-18 DIAGNOSIS — O26.891: ICD-10-CM

## 2019-02-18 DIAGNOSIS — J45.909: ICD-10-CM

## 2019-02-18 DIAGNOSIS — O20.9: Primary | ICD-10-CM

## 2019-02-18 DIAGNOSIS — O99.511: ICD-10-CM

## 2019-02-18 PROCEDURE — 36415 COLL VENOUS BLD VENIPUNCTURE: CPT

## 2019-02-18 PROCEDURE — 99406 BEHAV CHNG SMOKING 3-10 MIN: CPT

## 2019-02-18 PROCEDURE — 84702 CHORIONIC GONADOTROPIN TEST: CPT

## 2019-02-18 PROCEDURE — 99284 EMERGENCY DEPT VISIT MOD MDM: CPT

## 2019-02-18 NOTE — ER DOCUMENT REPORT
Addendum entered and electronically signed by NATHANIEL WEINSTEIN NP  

02/20/19 12:15: 








Discharge





- Discharge


Clinical Impression: 


 Vaginal bleeding during pregnancy





Condition: Stable


Disposition: HOME, SELF-CARE


Additional Instructions: 


As I have discussed with you you could possibly have be having a miscarriage or 

it could be a very early pregnancy.  You hCG went from 608-617.  Normally it 

should have gone up higher than this.  We will repeat the serum hCG quant in 48 

hours she is a blood test you will come in Wednesday afternoon after you get off

work and have drawn.  


As the results will not come back until after I have left I will call you the 

results on Thursday as long as you leave me a good number that I can call you 

Thursday morning.





PREGNANCY:





     You are pregnant.  Prenatal care is best started as early in pregnancy as 

possible.


     If you're unsure about continuing this pregnancy, you should discuss this 

with your physician or with counsellors at [facility].


     You should take only medications approved by your physician. Acetaminophen 

can safely be taken for minor pains.  As a rule, medication for chronic 

conditions such as asthma or seizures can safely be continued.  You should 

discuss with the physician every medicine you take.


     Any regular exercise program can be continued.  Talk to your physician, 

however, before engaging in competitive or demanding sports.


     Alcohol, smoking, and "street drugs" are dangerous to your baby. Cocaine is

especially dangerous.  Don't use any illicit drugs!








BLEEDING DURING EARLY PREGNANCY:





     You have been evaluated for passing blood while pregnant. While we take 

this symptom very seriously, most women with your degree of bleeding will go on 

to have a perfectly normal baby. At this time, there is no indication that a 

miscarriage will occur. (A miscarriage occurs when the fetus is abnormal.  There

is no medicine or treatment to prevent it.)


     A more serious cause of bleeding is tubal (or ectopic) pregnancy. An u

ltrasound usually can show whether the pregnancy is in the uterus or in the 

tube. Sometimes in early pregnancy, no fetus is seen. In this case, careful 

follow-up, including repeat blood tests and repeat ultrasound, is necessary.


     Do not douche or have sex for at least a week, or until OK'd by the doctor.

Don't use tampons.


     Call the doctor or return for re-examination if there is an increase in 

bleeding or cramping, extreme weakness, fainting, new abdominal pain, fever, or 

passage of tissue.





REPEAT BLOOD TEST:


     At this time, it is uncertain if you have a viable pregnancy.  During the 

first three months of pregnancy, the pregnancy hormone produced from the 

placenta will steadily rise, usually doubling in value every 2 - 3 days.  In 

order to determine if your pregnancy is viable and likely be succesful, a repeat

of this blood test for the pregnancy hormone is recommended in 2 - 3 days.  An 

order for this test to be done as an outpatient is being provided.  After you 

have this repeat test done, call your doctor or call us for the results.  If the

value of the test is increasing as would be expected in a normal pregnancy, then

your pregnancy is likely to be ok.  However, if the value of the test is 

declining, it will suggest something has happened with your pregnancy and it 

will not likely be a successful pregnancy.








FOLLOW-UP CARE:


If you have been referred to a physician for follow-up care, call the 

physicians office for an appointment as you were instructed or within the next 

two days.  If you experience worsening or a significant change in your symptoms 

(very heavy bleeding with large clots of blood, passage of tissue, more severe 

abdominal / pelvic pain or cramping, feeling faint or severe weakness, fever, 

etc.), notify the physician immediately or return to the Emergency Department at

any time for re-evaluation.





[facility] (OB-[facility]N) [facility] IN [place]:





[facility]


    [address] [place] [address]


    [phone]





Forms:  Follow-Up Laboratory Testing, Return to Work


Referrals: 


MARY ANN LARA MD [Primary Care Provider] - Follow up as needed





Original Note:








ED GI/





- General


Chief Complaint: Vag Bleeding, +preg <12wks


Stated Complaint: CRAMPING/BLEEDING


Time Seen by Provider: 02/18/19 20:26


Primary Care Provider: 


MARY ANN LARA MD [Primary Care Provider] - Follow up as needed


Mode of Arrival: Wheelchair


Information source: Patient


Notes: 





29-year-old female presents to ED for complaint of lower abdominal pain cramping

and bleeding.  She was seen last night for the same symptoms.  She states this 

is her fourth pregnancy.  She has 3 live children.  She states the cramping is 

bad at this time.  She states she has been bleeding yesterday the bleeding 

stopped and then started again today.  She states she is passing clots.


TRAVEL OUTSIDE OF THE [place] IN LAST 30 DAYS: No





- HPI


Patient complains to provider of: Pregnant, Vaginal bleeding


Onset: Yesterday


Timing/Duration: Intermittent


Quality of pain: Cramping


Severity at maximum: Moderate


Severity in ED: Moderate


Pain Level: 3


Location: Pelvis


Vaginal bleeding (Compared to normal period): Heavier, Passing clots


Associated symptoms: Other


Exacerbated by: Denies


Relieved by: Denies - Vaginal bleeding


Similar symptoms previously: Yes


Recently seen / treated by doctor: Yes





- Related Data


Allergies/Adverse Reactions: 


                                        





No Known Allergies Allergy (Verified 06/08/18 12:55)


   











Past Medical History





- General


Information source: Patient





- Social History


Smoking Status: Current Every Day Smoker


Cigarette use (# per day): Yes - 2-5 cig a day


Smoking Education Provided: Yes - 4 min


Frequency of alcohol use: None


Drug Abuse: None


Family History: CAD, Hypertension, Malignancy, Thyroid Disfunction, Other


Patient has suicidal ideation: No


Patient has homicidal ideation: No





- Past Medical History


Cardiac Medical History: Reports: None


Pulmonary Medical History: Reports: Hx Asthma


EENT Medical History: Reports: None


Neurological Medical History: Reports: Hx Migraine


Endocrine Medical History: Reports: None


Renal/ Medical History: Reports: None


Malignancy Medical History: Reports: Hx Cervical Cancer - pre-cancerous cells 

identified on pap 3 years ago, did not follow up- HPV


GI Medical History: Reports: Hx Gastroesophageal Reflux Disease


Musculoskeletal Medical History: Reports Hx Musculoskeletal Deformity


Skin Medical History: Reports None


Psychiatric Medical History: Reports: None


Traumatic Medical History: Reports: None


Infectious Medical History: Reports: None


Past Surgical History: Reports: Hx Cholecystectomy, Hx Herniorrhaphy, Hx Oral 

Surgery - wisdom teeth





- Immunizations


Immunizations up to date: Yes


Hx Diphtheria, Pertussis, Tetanus Vaccination: Yes





Review of Systems





- Review of Systems


Constitutional: No symptoms reported


EENT: No symptoms reported


Cardiovascular: No symptoms reported


Respiratory: No symptoms reported


Gastrointestinal: No symptoms reported


Genitourinary: No symptoms reported


Female Genitourinary: Pregnant, Vaginal bleeding, Other - Pelvic cramping


Musculoskeletal: No symptoms reported


Skin: No symptoms reported


Hematologic/Lymphatic: No symptoms reported


Neurological/Psychological: No symptoms reported


-: Yes All other systems reviewed and negative





Physical Exam





- Vital signs


Vitals: 


                                        











Temp Pulse Resp BP Pulse Ox


 


 98.8 F   99   16   136/81 H  99 


 


 02/18/19 18:45  02/18/19 18:45  02/18/19 18:45  02/18/19 18:45  02/18/19 18:45











Interpretation: Normal





- General


General appearance: Appears well, Alert





- HEENT


Head: Normocephalic, Atraumatic


Eyes: Normal


Pupils: PERRL





- Respiratory


Respiratory status: No respiratory distress


Chest status: Nontender


Breath sounds: Normal


Chest palpation: Normal





- Cardiovascular


Rhythm: Regular


Heart sounds: Normal auscultation


Murmur: No





- Abdominal


Inspection: Normal


Distension: No distension


Bowel sounds: Normal


Tenderness: Tender


Organomegaly: No organomegaly





- Back


Back: Normal, Nontender





- Extremities


General upper extremity: Normal inspection, Nontender, Normal color, Normal ROM,

Normal temperature


General lower extremity: Normal inspection, Nontender, Normal color, Normal ROM,

Normal temperature, Normal weight bearing.  No: Ashwin's sign





- Neurological


Neuro grossly intact: Yes


Cognition: Normal


Orientation: AAOx4


Xu Coma Scale Eye Opening: Spontaneous


Xu Coma Scale Verbal: Oriented


Penfield Coma Scale Motor: Obeys Commands


Penfield Coma Scale Total: 15


Speech: Normal


Motor strength normal: LUE, RUE, LLE, RLE


Sensory: Normal





- Psychological


Associated symptoms: Normal affect, Normal mood





- Skin


Skin Temperature: Warm


Skin Moisture: Dry


Skin Color: Normal





Course





- Re-evaluation


Re-evalutation: 





02/18/19 21:59


Results of hCG were discussed with patient.  Patient was given a lab request for

a repeat hCG in 48 hours.  Patient was given instructions to call [phone] on 

Thursday morning for the results of her hCG.  Patient verbalized understanding 

and agreement with this treatment plan.  Patient was discharged home





- Vital Signs


Vital signs: 


                                        











Temp Pulse Resp BP Pulse Ox


 


 98.8 F   99   16   136/81 H  99 


 


 02/18/19 18:45  02/18/19 18:45  02/18/19 18:45  02/18/19 18:45  02/18/19 18:45














- Laboratory


Laboratory results interpreted by me: 


                                        











  02/18/19





  20:35


 


Beta HCG, Quant  617.03 H














Discharge





- Discharge


Clinical Impression: 


 Vaginal bleeding during pregnancy





Condition: Stable


Disposition: HOME, SELF-CARE


Additional Instructions: 


As I have discussed with you you could possibly have be having a miscarriage or 

it could be a very early pregnancy.  You hCG went from 608-617.  Normally it 

should have gone up higher than this.  We will repeat the serum hCG quant in 48 

hours she is a blood test you will come in Wednesday afternoon after you get off

work and have drawn.  


As the results will not come back until after I have left I will call you the 

results on Thursday as long as you leave me a good number that I can call you 

Thursday morning.





PREGNANCY:





     You are pregnant.  Prenatal care is best started as early in pregnancy as 

possible.


     If you're unsure about continuing this pregnancy, you should discuss this 

with your physician or with counsellors at [facility].


     You should take only medications approved by your physician. Acetaminophen 

can safely be taken for minor pains.  As a rule, medication for chronic 

conditions such as asthma or seizures can safely be continued.  You should 

discuss with the physician every medicine you take.


     Any regular exercise program can be continued.  Talk to your physician, 

however, before engaging in competitive or demanding sports.


     Alcohol, smoking, and "street drugs" are dangerous to your baby. Cocaine is

especially dangerous.  Don't use any illicit drugs!








BLEEDING DURING EARLY PREGNANCY:





     You have been evaluated for passing blood while pregnant. While we take thi

s symptom very seriously, most women with your degree of bleeding will go on to 

have a perfectly normal baby. At this time, there is no indication that a 

miscarriage will occur. (A miscarriage occurs when the fetus is abnormal.  There

is no medicine or treatment to prevent it.)


     A more serious cause of bleeding is tubal (or ectopic) pregnancy. An 

ultrasound usually can show whether the pregnancy is in the uterus or in the 

tube. Sometimes in early pregnancy, no fetus is seen. In this case, careful 

follow-up, including repeat blood tests and repeat ultrasound, is necessary.


     Do not douche or have sex for at least a week, or until OK'd by the doctor.

Don't use tampons.


     Call the doctor or return for re-examination if there is an increase in 

bleeding or cramping, extreme weakness, fainting, new abdominal pain, fever, or 

passage of tissue.





REPEAT BLOOD TEST:


     At this time, it is uncertain if you have a viable pregnancy.  During the 

first three months of pregnancy, the pregnancy hormone produced from the 

placenta will steadily rise, usually doubling in value every 2 - 3 days.  In 

order to determine if your pregnancy is viable and likely be succesful, a repeat

of this blood test for the pregnancy hormone is recommended in 2 - 3 days.  An 

order for this test to be done as an outpatient is being provided.  After you 

have this repeat test done, call your doctor or call us for the results.  If the

value of the test is increasing as would be expected in a normal pregnancy, then

your pregnancy is likely to be ok.  However, if the value of the test is 

declining, it will suggest something has happened with your pregnancy and it 

will not likely be a successful pregnancy.








FOLLOW-UP CARE:


If you have been referred to a physician for follow-up care, call the 

physicians office for an appointment as you were instructed or within the next 

two days.  If you experience worsening or a significant change in your symptoms 

(very heavy bleeding with large clots of blood, passage of tissue, more severe 

abdominal / pelvic pain or cramping, feeling faint or severe weakness, fever, 

etc.), notify the physician immediately or return to the Emergency Department at

any time for re-evaluation.





[facility] (OB-GYN) PHYSICIANS IN [place]:





[facility]


    [address] [place] [address]


    [phone]





Forms:  Follow-Up Laboratory Testing


Referrals: 


MARY ANN LARA MD [Primary Care Provider] - Follow up as needed

## 2019-02-20 ENCOUNTER — HOSPITAL ENCOUNTER (OUTPATIENT)
Dept: HOSPITAL 62 - LAB | Age: 29
End: 2019-02-20
Attending: NURSE PRACTITIONER
Payer: COMMERCIAL

## 2019-02-20 DIAGNOSIS — N93.9: Primary | ICD-10-CM

## 2019-02-20 PROCEDURE — 36415 COLL VENOUS BLD VENIPUNCTURE: CPT

## 2019-02-20 PROCEDURE — 84702 CHORIONIC GONADOTROPIN TEST: CPT

## 2019-03-19 ENCOUNTER — HOSPITAL ENCOUNTER (EMERGENCY)
Dept: HOSPITAL 62 - ER | Age: 29
Discharge: HOME | End: 2019-03-19
Payer: COMMERCIAL

## 2019-03-19 VITALS — SYSTOLIC BLOOD PRESSURE: 106 MMHG | DIASTOLIC BLOOD PRESSURE: 59 MMHG

## 2019-03-19 DIAGNOSIS — R50.9: ICD-10-CM

## 2019-03-19 DIAGNOSIS — F17.200: ICD-10-CM

## 2019-03-19 DIAGNOSIS — R00.0: ICD-10-CM

## 2019-03-19 DIAGNOSIS — Z20.818: ICD-10-CM

## 2019-03-19 DIAGNOSIS — J45.909: ICD-10-CM

## 2019-03-19 DIAGNOSIS — R07.9: ICD-10-CM

## 2019-03-19 DIAGNOSIS — J02.9: ICD-10-CM

## 2019-03-19 DIAGNOSIS — R05: Primary | ICD-10-CM

## 2019-03-19 LAB
A TYPE INFLUENZA AG: NEGATIVE
B INFLUENZA AG: NEGATIVE

## 2019-03-19 PROCEDURE — 87070 CULTURE OTHR SPECIMN AEROBIC: CPT

## 2019-03-19 PROCEDURE — 96374 THER/PROPH/DIAG INJ IV PUSH: CPT

## 2019-03-19 PROCEDURE — 87804 INFLUENZA ASSAY W/OPTIC: CPT

## 2019-03-19 PROCEDURE — 99283 EMERGENCY DEPT VISIT LOW MDM: CPT

## 2019-03-19 PROCEDURE — 71046 X-RAY EXAM CHEST 2 VIEWS: CPT

## 2019-03-19 PROCEDURE — 96361 HYDRATE IV INFUSION ADD-ON: CPT

## 2019-03-19 PROCEDURE — 87880 STREP A ASSAY W/OPTIC: CPT

## 2019-03-19 NOTE — RADIOLOGY REPORT (SQ)
EXAM DESCRIPTION: 



XR CHEST 2 VIEWS



COMPLETED DATE/TME:  03/19/2019 04:20



CLINICAL HISTORY: 



29 years, Female, cough, fever



Comparison: None 



FINDINGS:

No focal lung consolidation.

No pleural effusion. No pneumothorax.

Cardiac and mediastinal silhouette is unremarkable.

No acute osseous abnormality. 

Soft tissues are unremarkable. 



IMPRESSION:

No acute findings. No focal lung consolidation.

## 2019-03-19 NOTE — ER DOCUMENT REPORT
ED General





- General


Chief Complaint: Flu Symptoms


Stated Complaint: FLU SYMPTOMS


Time Seen by Provider: 03/19/19 04:14


Primary Care Provider: 


NATHANIEL WEINSTEIN NP [NURSE PRACTITIONER] - Follow up as needed


Notes: 





Patient is a pleasant 29-year-old female presents with complaint of having 

fever, body aches, cough and congestion.  Symptoms started yesterday.  She says 

that her daughter and her daughter's godmother recent diagnosed with strep and 

are currently undergoing treatment for strep throat.  She denies any abdominal 

pain.  No vomiting.  No diarrhea.  She says she just hurts all over.  She has no

chronic medical problems.  She is not taking medications.  She does smoke.  She 

does not drink or do drugs.


TRAVEL OUTSIDE OF THE [place] IN LAST 30 DAYS: No





- Related Data


Allergies/Adverse Reactions: 


                                        





No Known Allergies Allergy (Verified 06/08/18 12:55)


   











Past Medical History





- Social History


Smoking Status: Current Every Day Smoker


Frequency of alcohol use: None


Drug Abuse: None


Family History: CAD, Hypertension, Malignancy, Thyroid Disfunction, Other


Pulmonary Medical History: Reports: Hx Asthma


Neurological Medical History: Reports: Hx Migraine


Endocrine Medical History: Denies: Hx Diabetes Mellitus Type 1, Hx Diabetes 

Mellitus Type 2


Renal/ Medical History: Denies: Hx Peritoneal Dialysis


Malignancy Medical History: Reports: Hx Cervical Cancer - pre-cancerous cells 

identified on pap 3 years ago, did not follow up- HPV


GI Medical History: Reports: Hx Gastroesophageal Reflux Disease


Musculoskeletal Medical History: Reports Hx Musculoskeletal Deformity


Past Surgical History: Reports: Hx Cholecystectomy, Hx Herniorrhaphy, Hx Oral 

Surgery - wisdom teeth





- Immunizations


Immunizations up to date: Yes


Hx Diphtheria, Pertussis, Tetanus Vaccination: Yes





Review of Systems





- Review of Systems


Notes: 





My Normal Review Basic





REVIEW OF SYSTEMS:


CONSTITUTIONAL : Fever


EENT:   Congestion


CARDIOVASCULAR: Pain over left side of chest with coughing


RESPIRATORY: Cough


GASTROINTESTINAL:  Denies abdominal pain.  Denies nausea, vomiting, or diarrhea.

 


GENITOURINARY:  Denies difficulty urinating, painful urination, burning, 

frequency, or blood in urine.


FEMALE  GENITOURINARY:  Denies vaginal bleeding, abnormal or irregular periods. 

Denies chance that she would be pregnant.


MUSCULOSKELETAL: Body aches


SKIN:   Denies rash or skin lesions.


NEUROLOGICAL:  Denies altered mental status or loss of consciousness.  Mild 

headache.  Denies weakness or paralysis or loss of use of either side.  Denies 

problems with gait or speech.  Denies sensory or motor loss.





ALL OTHER SYSTEMS REVIEWED AND NEGATIVE.





Physical Exam





- Vital signs


Vitals: 


                                        











Temp Pulse Resp BP Pulse Ox


 


 99.5 F   105 H  18   118/64   100 


 


 03/19/19 04:07  03/19/19 04:07  03/19/19 04:07  03/19/19 04:07  03/19/19 04:07














- Notes


Notes: 





General Appearance: Well nourished, alert, cooperative, no acute distress, mild 

to moderate obvious discomfort.  Current dry cough on exam


Vitals: reviewed, See vital signs table.


Head: no swelling or tenderness to the head


Eyes: PERRL, EOMI, Conjuctiva clear


Mouth: No decreasd moisture


Throat: Mild tonsillar inflammation, No airway obstruction,  No lymphadenopathy


Neck: Supple, no neck tenderness, No thyromegaly


Lungs: No wheezing, No rales, No rhonci, No accessory muscle use, good air 

exchange bilaterally.


Heart: Tachycardic rate, Regular rythm, No murmur, no rub


Abdomen: Normal BS, soft, No rigidity, No abdominal tenderness, No guarding, no 

rebound, no abdominal masses, no organomegaly


Extremities: strength 5/5 in all extremities, good pulses in all extremities, no

swelling or tenderness in the extremities, no edema.


Skin: warm, dry, appropriate color, no rash


Neuro: speech clear, oriented x 3, normal affect, responds appropriately to 

questions.





Course





- Re-evaluation


Re-evalutation: 





03/19/19 05:50


Patient has symptoms consistent with influenza.  Flu testing is negative however

flu testing is 100% sensitive and patient symptoms are consistent with fluid 

such as body aches, fevers, sore throat, cough, congestion.  I did talk to 

patient about potential taken Tamiflu.  She is within 48-hour window.  I 

reviewed the risks and benefits of Tamiflu.  At this time patient wants to hold 

off with from the Tamiflu.  She rather just do symptomatic care at home which I 

think is appropriate.  I informed her to take Tylenol Motrin scheduled to help 

with body aches and fevers.  Encouraged to drink lots of liquids.  Chest x-ray 

is negative.  No evidence of known pneumonia.  She is feeling improved with the 

Toradol as well as IV fluids.  I strongly encouraged her return to ER 

immediately if she has difficulty breathing, fevers not responding to Tylenol, 

or she feels that she is worsening in any way.  Patient agrees with plan will be

discharged home.





Dictation of this chart was performed using voice recognition software; 

therefore, there may be some unintended grammatical errors.





- Vital Signs


Vital signs: 


                                        











Temp Pulse Resp BP Pulse Ox


 


 99.5 F   105 H  18   118/64   100 


 


 03/19/19 04:07  03/19/19 04:07  03/19/19 04:07  03/19/19 04:07  03/19/19 04:07














Discharge





- Discharge


Clinical Impression: 


 Cough, Body aches, Sore throat





Condition: Good


Disposition: HOME, SELF-CARE


Additional Instructions: 


Based on your symptoms I suspect you may have the flu.  Treatment is symptomatic

care which includes drinking lots of non-caffeinated liquids, taking Tylenol 500

mg every 4 hours, taking ibuprofen 400 mg every 6 hours.  This will help 

alleviate body aches and fever.  Please try and quit smoking.  Please return to 

the ER immediately if you have difficulty breathing, recurrent fevers not 

responding to Tylenol, or if you feel that you are worsening in any way.  

Follow-up with your doctor in 2-3 days for reevaluation.


Forms:  Return to Work

## 2019-06-13 ENCOUNTER — HOSPITAL ENCOUNTER (EMERGENCY)
Dept: HOSPITAL 62 - ER | Age: 29
Discharge: LEFT BEFORE DISCHARGE | End: 2019-06-13
Payer: SELF-PAY

## 2019-06-13 VITALS — DIASTOLIC BLOOD PRESSURE: 82 MMHG | SYSTOLIC BLOOD PRESSURE: 143 MMHG

## 2019-06-13 DIAGNOSIS — R07.9: Primary | ICD-10-CM

## 2019-06-13 DIAGNOSIS — I10: ICD-10-CM

## 2019-06-13 DIAGNOSIS — R06.02: ICD-10-CM

## 2019-06-13 DIAGNOSIS — J45.909: ICD-10-CM

## 2019-06-13 DIAGNOSIS — R25.1: ICD-10-CM

## 2019-06-13 DIAGNOSIS — F17.200: ICD-10-CM

## 2019-06-13 DIAGNOSIS — R42: ICD-10-CM

## 2019-06-13 LAB
ADD MANUAL DIFF: NO
ALBUMIN SERPL-MCNC: 4.7 G/DL (ref 3.5–5)
ALP SERPL-CCNC: 81 U/L (ref 38–126)
ALT SERPL-CCNC: < 6 U/L (ref 9–52)
ANION GAP SERPL CALC-SCNC: 10 MMOL/L (ref 5–19)
APPEARANCE UR: (no result)
APTT PPP: YELLOW S
AST SERPL-CCNC: 34 U/L (ref 14–36)
BASOPHILS # BLD AUTO: 0.1 10^3/UL (ref 0–0.2)
BASOPHILS NFR BLD AUTO: 0.9 % (ref 0–2)
BILIRUB DIRECT SERPL-MCNC: 0.6 MG/DL (ref 0–0.4)
BILIRUB SERPL-MCNC: 1.2 MG/DL (ref 0.2–1.3)
BILIRUB UR QL STRIP: NEGATIVE
BUN SERPL-MCNC: 9 MG/DL (ref 7–20)
CALCIUM: 10.1 MG/DL (ref 8.4–10.2)
CHLORIDE SERPL-SCNC: 106 MMOL/L (ref 98–107)
CK MB SERPL-MCNC: < 0.22 NG/ML (ref ?–4.55)
CO2 SERPL-SCNC: 24 MMOL/L (ref 22–30)
EOSINOPHIL # BLD AUTO: 0.2 10^3/UL (ref 0–0.6)
EOSINOPHIL NFR BLD AUTO: 2.7 % (ref 0–6)
ERYTHROCYTE [DISTWIDTH] IN BLOOD BY AUTOMATED COUNT: 17.4 % (ref 11.5–14)
GLUCOSE SERPL-MCNC: 89 MG/DL (ref 75–110)
GLUCOSE UR STRIP-MCNC: NEGATIVE MG/DL
HCT VFR BLD CALC: 40.5 % (ref 36–47)
HGB BLD-MCNC: 13.3 G/DL (ref 12–15.5)
KETONES UR STRIP-MCNC: NEGATIVE MG/DL
LIPASE SERPL-CCNC: 61.1 U/L (ref 23–300)
LYMPHOCYTES # BLD AUTO: 1.8 10^3/UL (ref 0.5–4.7)
LYMPHOCYTES NFR BLD AUTO: 25.2 % (ref 13–45)
MCH RBC QN AUTO: 26 PG (ref 27–33.4)
MCHC RBC AUTO-ENTMCNC: 32.7 G/DL (ref 32–36)
MCV RBC AUTO: 79 FL (ref 80–97)
MONOCYTES # BLD AUTO: 0.4 10^3/UL (ref 0.1–1.4)
MONOCYTES NFR BLD AUTO: 6 % (ref 3–13)
NEUTROPHILS # BLD AUTO: 4.6 10^3/UL (ref 1.7–8.2)
NEUTS SEG NFR BLD AUTO: 65.2 % (ref 42–78)
NITRITE UR QL STRIP: NEGATIVE
PH UR STRIP: 6 [PH] (ref 5–9)
PLATELET # BLD: 269 10^3/UL (ref 150–450)
POTASSIUM SERPL-SCNC: 5.3 MMOL/L (ref 3.6–5)
PROT SERPL-MCNC: 8.6 G/DL (ref 6.3–8.2)
PROT UR STRIP-MCNC: NEGATIVE MG/DL
RBC # BLD AUTO: 5.11 10^6/UL (ref 3.72–5.28)
SODIUM SERPL-SCNC: 140.1 MMOL/L (ref 137–145)
SP GR UR STRIP: 1.02
TOTAL CELLS COUNTED % (AUTO): 100 %
TROPONIN I SERPL-MCNC: < 0.012 NG/ML
UROBILINOGEN UR-MCNC: NEGATIVE MG/DL (ref ?–2)
WBC # BLD AUTO: 7 10^3/UL (ref 4–10.5)

## 2019-06-13 PROCEDURE — 80053 COMPREHEN METABOLIC PANEL: CPT

## 2019-06-13 PROCEDURE — 82553 CREATINE MB FRACTION: CPT

## 2019-06-13 PROCEDURE — 84703 CHORIONIC GONADOTROPIN ASSAY: CPT

## 2019-06-13 PROCEDURE — 99283 EMERGENCY DEPT VISIT LOW MDM: CPT

## 2019-06-13 PROCEDURE — 93005 ELECTROCARDIOGRAM TRACING: CPT

## 2019-06-13 PROCEDURE — 85025 COMPLETE CBC W/AUTO DIFF WBC: CPT

## 2019-06-13 PROCEDURE — 82962 GLUCOSE BLOOD TEST: CPT

## 2019-06-13 PROCEDURE — 71045 X-RAY EXAM CHEST 1 VIEW: CPT

## 2019-06-13 PROCEDURE — 81001 URINALYSIS AUTO W/SCOPE: CPT

## 2019-06-13 PROCEDURE — 83690 ASSAY OF LIPASE: CPT

## 2019-06-13 PROCEDURE — 36415 COLL VENOUS BLD VENIPUNCTURE: CPT

## 2019-06-13 PROCEDURE — 93010 ELECTROCARDIOGRAM REPORT: CPT

## 2019-06-13 PROCEDURE — 84484 ASSAY OF TROPONIN QUANT: CPT

## 2019-06-13 NOTE — ER DOCUMENT REPORT
ED Medical Screen (RME)





- General


Chief Complaint: Chest Pain


Stated Complaint: DIZZINESS


Time Seen by Provider: 06/13/19 11:28


Primary Care Provider: 


MARY ANN LARA MD [Primary Care Provider] - Follow up as needed


Mode of Arrival: Ambulatory


Information source: Patient


Notes: 





29-year-old female presented to ED for complaint of headache, chest pain, body 

aches, elevated pulse, and blood pressure, dizziness, double vision, and almost 

fainted yesterday.  She states she has had the symptoms off and on for 4 days.  

She does have a history of a slightly elevated blood pressure with gallbladder 

removal.  She does smoke half pack a day drinks once a week and at that time it 

will be about 3 drinks.  Has not had anything today.  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 

started back to work.  Patient is alert, oriented, respirations regular and 

unlabored, lungs clear to auscultation, speaking in full sentences, and walks 

with a even steady gait.











I have greeted and performed a rapid initial assessment of this patient.  A 

comprehensive ED assessment and evaluation of the patient, analysis of test 

results and completion of medical decision making process will be conducted by 

an additional ED providers.





Dictation of this chart was performed using voice recognition software; 

therefore, there may be some unintended grammatical errors.


TRAVEL OUTSIDE OF THE [place] IN LAST 30 DAYS: No





- Related Data


Allergies/Adverse Reactions: 


                                        





No Known Allergies Allergy (Verified 06/13/19 10:55)


   











Past Medical History





- Social History


Family history: Reviewed & Not Pertinent


Pulmonary Medical History: Reports: Hx Asthma


Neurological Medical History: Reports: Hx Migraine


Endocrine Medical History: Denies: Hx Diabetes Mellitus Type 1, Hx Diabetes 

Mellitus Type 2


Renal/ Medical History: Denies: Hx Peritoneal Dialysis


Malignancy Medical History: Reports: Hx Cervical Cancer - pre-cancerous cells 

identified on pap 3 years ago, did not follow up- HPV


GI Medical History: Reports: Hx Gastroesophageal Reflux Disease


Musculoskeltal Medical History: Reports Hx Musculoskeletal Deformity


Past Surgical History: Reports: Hx Cholecystectomy, Hx Herniorrhaphy, Hx Oral 

Surgery - wisdom teeth





- Immunizations


Immunizations up to date: Yes


Hx Diphtheria, Pertussis, Tetanus Vaccination: Yes





Physical Exam





- Vital signs


Vitals: 





                                        











Temp Pulse Resp BP Pulse Ox


 


 98.1 F   87   16   143/82 H  98 


 


 06/13/19 11:04  06/13/19 11:04  06/13/19 11:04  06/13/19 11:04  06/13/19 11:04














Course





- Vital Signs


Vital signs: 





                                        











Temp Pulse Resp BP Pulse Ox


 


 98.1 F   87   16   143/82 H  98 


 


 06/13/19 11:04  06/13/19 11:04  06/13/19 11:04  06/13/19 11:04  06/13/19 11:04














Doctor's Discharge





- Discharge


Referrals: 


MARY ANN LARA MD [Primary Care Provider] - Follow up as needed

## 2019-06-13 NOTE — ER DOCUMENT REPORT
ED General





- General


Chief Complaint: Chest Pain


Stated Complaint: DIZZINESS


Time Seen by Provider: 06/13/19 11:28


Primary Care Provider: 


MARY ANN LARA MD [Primary Care Provider] - Follow up as needed


Mode of Arrival: Ambulatory


TRAVEL OUTSIDE OF THE [place] IN LAST 30 DAYS: No





- HPI


Notes: 





Patient is a 29-year-old female that presents to the emergency department for 

chief complaint of hypertension.


Patient has a multitude of complaints.  She states over the last 3 days she has 

felt dizzy, lightheaded, shaky and has had intermittent chest pains.  She states

that she can have symptoms when sitting or standing.  She denies symptoms with 

position changes.  Patient states like she has a fast heart rate and her blood 

pressure has been elevated.  She states she has been told she has hypertension 

in the past but gets most of her care at urgent care or emergency room's and has

not been on blood pressure medications for this reason.  She states her chest 

pain is a heaviness and does have associated lightheadedness and shortness of 

breath.  It is intermittent and lasts for a few hours at a time.  There are no 

aggravating or relieving factors.  She denies history of cardiac disease.  

Patient states she is beginning to have symptoms again because of how long she 

has had wait for care.





Past Medical History: Negative





Past Surgical History: Cholecystectomy





Social History: Daily tobacco, occasional alcohol, denies drug use





Family History: Reviewed and noncontributory for presenting illness





Allergies: Reviewed, see documented allergy list.








REVIEW OF SYSTEMS:





CONSTITUTIONAL : 





No fever





No chills





No diaphoresis





No recent illness





EENT:





No vision changes





No congestion





No sore throat  





CARDIOVASCULAR:





 chest pain





 palpitations





RESPIRATORY:





 shortness of breath





No cough





No difficulty breathing





GASTROINTESTINAL: 





No abdominal pain





No nausea





No vomiting





No diarrhea





GENITOURINARY:





No dysuria





No hematuria





No difficulty urinating





MUSCULOSKELETAL:





No back pain





No leg pain





No arm pain





SKIN:  





No rashes





No lesions





LYMPHATIC: 





No swollen, enlarged glands.





NEUROLOGICAL: 





 lightheadedness





No headache





No weakness





No paresthesias





PSYCHIATRIC:





No anxiety





No depression 








PHYSICAL EXAMINATION:





Vital signs reviewed, nursing noted reviewed.





GENERAL: Well-appearing, well-nourished and in no acute distress.





HEAD: Atraumatic, normocephalic.





EYES: Eyes appear normal, extraocular movements intact, sclera anicteric, 

conjunctiva are normal.





ENT: No apparent facial trauma.  Moist mucous membranes





NECK: Normal range of motion 





LUNGS: patient refused auscultation.  No tachypnea or accessory muscle use 

visualized





HEART: Regular rate and rhythm 





ABDOMEN: Patient refused abdominal exam





EXTREMITIES:  good range of motion, no pitting or edema. 





NEUROLOGICAL: Ambulating.  No focal neurological deficits. Moves all extremities

spontaneously Motor and sensory grossly intact on exam.





PSYCH: Aggravated, rapid pressured speech





SKIN: Warm, Dry,  no rashes or lesions noted on exposed skin











- Related Data


Allergies/Adverse Reactions: 


                                        





No Known Allergies Allergy (Verified 06/13/19 10:55)


   











Past Medical History





- General


Information source: Patient





- Social History


Smoking Status: Current Every Day Smoker


Frequency of alcohol use: Occasional


Drug Abuse: None


Family History: CAD, Hypertension, Malignancy, Thyroid Disfunction, Other


Patient has suicidal ideation: No


Patient has homicidal ideation: No


Pulmonary Medical History: Reports: Hx Asthma


Neurological Medical History: Reports: Hx Migraine


Endocrine Medical History: Denies: Hx Diabetes Mellitus Type 1, Hx Diabetes 

Mellitus Type 2


Renal/ Medical History: Denies: Hx Peritoneal Dialysis


Malignancy Medical History: Reports: Hx Cervical Cancer - pre-cancerous cells 

identified on pap 3 years ago, did not follow up- HPV


GI Medical History: Reports: Hx Gastroesophageal Reflux Disease


Musculoskeletal Medical History: Reports Hx Musculoskeletal Deformity


Past Surgical History: Reports: Hx Cholecystectomy, Hx Herniorrhaphy, Hx Oral 

Surgery - wisdom teeth





- Immunizations


Immunizations up to date: Yes


Hx Diphtheria, Pertussis, Tetanus Vaccination: Yes





Physical Exam





- Vital signs


Vitals: 





                                        











Temp Pulse Resp BP Pulse Ox


 


 98.1 F   87   16   143/82 H  98 


 


 06/13/19 11:04  06/13/19 11:04  06/13/19 11:04  06/13/19 11:04  06/13/19 11:04














Course





- Re-evaluation


Re-evalutation: 





06/13/19 16:28


Vitals reviewed.  Nursing notes reviewed.  Patient blood pressure is elevated.  

She has no sign of endorgan damage on blood work including normal renal function

and troponin.  She has no electrolyte abnormalities.  Patient is not anemic.  

She has a contaminated urine sample but is not symptomatic of acute UTI 

therefore not requiring antibiotics at this time. 





When I entered the room to meet the patient she was not present in her room and 

her room was being cleaned.  Patient was standing at the nursing station being 

verbally combative with the nurses.  Patient was swearing and demanding that she

be seen next.  She was aggravated that she has been waiting so long for her 

results.  When I asked the patient to come into the room she obliged and would 

talk with me.  She would not allow me to perform any hands on physical exam.  

Physical exam was obtained while looking at the patient during our conversation.

 Her lab results were told to her.  While we were talking patient was yelling 

and swearing at me.  She states that her chest pain has been returning.  I did 

advise if she is having intermittent chest pain in the setting of elevated blood

pressure that she should be put on the monitor and have a delta troponin drawn 

and be admitted to the hospital.  At that time patient told me "oh hell no" and 

then walked out of the room.  She did not receive discharge instructions and did

not sign AMA paperwork.  Prior to her leaving I had advised her on her blood 

pressure and told her that she likely needs to be on blood pressure medications.





Laboratory











  06/13/19 06/13/19 06/13/19





  11:45 11:45 11:45


 


WBC  7.0  


 


RBC  5.11  


 


Hgb  13.3  


 


Hct  40.5  


 


MCV  79 L  


 


MCH  26.0 L  


 


MCHC  32.7  


 


RDW  17.4 H  


 


Plt Count  269  


 


Seg Neutrophils %  65.2  


 


Lymphocytes %  25.2  


 


Monocytes %  6.0  


 


Eosinophils %  2.7  


 


Basophils %  0.9  


 


Absolute Neutrophils  4.6  


 


Absolute Lymphocytes  1.8  


 


Absolute Monocytes  0.4  


 


Absolute Eosinophils  0.2  


 


Absolute Basophils  0.1  


 


Sodium   140.1 


 


Potassium   5.3 H 


 


Chloride   106 


 


Carbon Dioxide   24 


 


Anion Gap   10 


 


BUN   9 


 


Creatinine   0.81 


 


Est GFR ( Amer)   > 60 


 


Est GFR (Non-Af Amer)   > 60 


 


Glucose   89 


 


Calcium   10.1 


 


Total Bilirubin   1.2 


 


Direct Bilirubin   0.6 H 


 


Neonat Total Bilirubin   Not Reportable 


 


Neonat Direct Bilirubin   Not Reportable 


 


Neonat Indirect Bili   Not Reportable 


 


AST   34 


 


ALT   < 6 L 


 


Alkaline Phosphatase   81 


 


CK-MB (CK-2)   


 


Troponin I   


 


Total Protein   8.6 H 


 


Albumin   4.7 


 


Lipase   61.1 


 


Serum HCG, Qual    NEGATIVE


 


Urine Color   


 


Urine Appearance   


 


Urine pH   


 


Ur Specific Gravity   


 


Urine Protein   


 


Urine Glucose (UA)   


 


Urine Ketones   


 


Urine Blood   


 


Urine Nitrite   


 


Urine Bilirubin   


 


Urine Urobilinogen   


 


Ur Leukocyte Esterase   


 


Urine WBC (Auto)   


 


Urine RBC (Auto)   


 


Urine Bacteria (Auto)   


 


Squamous Epi Cells Auto   


 


Urine Mucus (Auto)   


 


Urine Ascorbic Acid   














  06/13/19 06/13/19





  11:45 11:45


 


WBC  


 


RBC  


 


Hgb  


 


Hct  


 


MCV  


 


MCH  


 


MCHC  


 


RDW  


 


Plt Count  


 


Seg Neutrophils %  


 


Lymphocytes %  


 


Monocytes %  


 


Eosinophils %  


 


Basophils %  


 


Absolute Neutrophils  


 


Absolute Lymphocytes  


 


Absolute Monocytes  


 


Absolute Eosinophils  


 


Absolute Basophils  


 


Sodium  


 


Potassium  


 


Chloride  


 


Carbon Dioxide  


 


Anion Gap  


 


BUN  


 


Creatinine  


 


Est GFR ( Amer)  


 


Est GFR (Non-Af Amer)  


 


Glucose  


 


Calcium  


 


Total Bilirubin  


 


Direct Bilirubin  


 


Neonat Total Bilirubin  


 


Neonat Direct Bilirubin  


 


Neonat Indirect Bili  


 


AST  


 


ALT  


 


Alkaline Phosphatase  


 


CK-MB (CK-2)  < 0.22 


 


Troponin I  < 0.012 


 


Total Protein  


 


Albumin  


 


Lipase  


 


Serum HCG, Qual  


 


Urine Color   YELLOW


 


Urine Appearance   CLOUDY


 


Urine pH   6.0


 


Ur Specific Gravity   1.020


 


Urine Protein   NEGATIVE


 


Urine Glucose (UA)   NEGATIVE


 


Urine Ketones   NEGATIVE


 


Urine Blood   MODERATE H


 


Urine Nitrite   NEGATIVE


 


Urine Bilirubin   NEGATIVE


 


Urine Urobilinogen   NEGATIVE


 


Ur Leukocyte Esterase   LARGE H


 


Urine WBC (Auto)   10


 


Urine RBC (Auto)   2


 


Urine Bacteria (Auto)   1+


 


Squamous Epi Cells Auto   16


 


Urine Mucus (Auto)   MOD


 


Urine Ascorbic Acid   NEGATIVE











                                        





Chest X-Ray  06/13/19 11:33


IMPRESSION:  NO ACUTE RADIOGRAPHIC FINDING IN THE CHEST.


 














- Vital Signs


Vital signs: 





                                        











Temp Pulse Resp BP Pulse Ox


 


 98.1 F   87   16   143/82 H  98 


 


 06/13/19 11:04  06/13/19 11:04  06/13/19 11:04  06/13/19 11:04  06/13/19 11:04














- Laboratory


Result Diagrams: 


                                 06/13/19 11:45





                                 06/13/19 11:45


Laboratory results interpreted by me: 





                                        











  06/13/19 06/13/19 06/13/19





  11:45 11:45 11:45


 


MCV  79 L  


 


MCH  26.0 L  


 


RDW  17.4 H  


 


Potassium   5.3 H 


 


Direct Bilirubin   0.6 H 


 


ALT   < 6 L 


 


Total Protein   8.6 H 


 


Urine Blood    MODERATE H


 


Ur Leukocyte Esterase    LARGE H














- EKG Interpretation by Me


Additional EKG results interpreted by me: 





06/13/19 16:28


Interpreted by myself


1059: Normal sinus rhythm, rate 85, normal axis, no ectopy, no STEMI





Discharge





- Discharge


Clinical Impression: 


 Dizziness





Chest pain


Qualifiers:


 Chest pain type: unspecified Qualified Code(s): R07.9 - Chest pain, unspecified





Hypertension


Qualifiers:


 Hypertension type: unspecified Qualified Code(s): I10 - Essential (primary) 

hypertension





Condition: Stable


Disposition: AGAINST MEDICAL ADVICE


Referrals: 


MARY ANN LARA MD [Primary Care Provider] - Follow up as needed

## 2019-06-13 NOTE — RADIOLOGY REPORT (SQ)
EXAM DESCRIPTION:  CHEST SINGLE VIEW



COMPLETED DATE/TIME:  6/13/2019 12:04 pm



REASON FOR STUDY:  Chest pain almost syncope



COMPARISON:  3/19/2019



EXAM PARAMETERS:  NUMBER OF VIEWS: One view.

TECHNIQUE: Single frontal radiographic view of the chest acquired.

RADIATION DOSE: NA

LIMITATIONS: None.



FINDINGS:  LUNGS AND PLEURA: No opacities, masses or pneumothorax. No pleural effusion.

MEDIASTINUM AND HILAR STRUCTURES: No masses.  Contour normal.

HEART AND VASCULAR STRUCTURES: Heart normal in size.  Normal vasculature.

BONES: No acute findings.

HARDWARE: None in the chest.

OTHER: No other significant finding.



IMPRESSION:  NO ACUTE RADIOGRAPHIC FINDING IN THE CHEST.



TECHNICAL DOCUMENTATION:  JOB ID:  [id number]

 2011 [employer]- All Rights Reserved



Reading location - IP/workstation name: [email or web address]

## 2019-10-20 ENCOUNTER — HOSPITAL ENCOUNTER (EMERGENCY)
Dept: HOSPITAL 62 - ER | Age: 29
Discharge: HOME | End: 2019-10-20
Payer: COMMERCIAL

## 2019-10-20 VITALS — SYSTOLIC BLOOD PRESSURE: 131 MMHG | DIASTOLIC BLOOD PRESSURE: 78 MMHG

## 2019-10-20 DIAGNOSIS — R11.0: ICD-10-CM

## 2019-10-20 DIAGNOSIS — R05: ICD-10-CM

## 2019-10-20 DIAGNOSIS — J11.1: Primary | ICD-10-CM

## 2019-10-20 DIAGNOSIS — R68.89: ICD-10-CM

## 2019-10-20 DIAGNOSIS — Z90.49: ICD-10-CM

## 2019-10-20 DIAGNOSIS — F17.210: ICD-10-CM

## 2019-10-20 DIAGNOSIS — R50.9: ICD-10-CM

## 2019-10-20 LAB
A TYPE INFLUENZA AG: POSITIVE
B INFLUENZA AG: POSITIVE

## 2019-10-20 PROCEDURE — 71046 X-RAY EXAM CHEST 2 VIEWS: CPT

## 2019-10-20 PROCEDURE — 99283 EMERGENCY DEPT VISIT LOW MDM: CPT

## 2019-10-20 PROCEDURE — 87804 INFLUENZA ASSAY W/OPTIC: CPT

## 2019-10-20 PROCEDURE — 94640 AIRWAY INHALATION TREATMENT: CPT

## 2019-10-20 NOTE — ER DOCUMENT REPORT
HPI





- HPI


Patient complains to provider of: cough, fever, congestion


Time Seen by Provider: 10/20/19 11:02


Onset: Yesterday


Onset/Duration: Sudden


Quality of pain: Achy


Context: 





This 29-year-old female with history of asthma presents emergency department 

with complaints of congestion productive cough fever of 103 this morning at 

0500.  Reports she took Tylenol at that time.  Denies vomiting diarrhea but 

reports the cough is making her nauseated.  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


Associated Symptoms: Productive cough, Fever


Exacerbated by: Denies


Relieved by: Denies


Similar symptoms previously: Yes


Recently seen / treated by doctor: No





- REPRODUCTIVE


Reproductive: DENIES: Pregnant:





Past Medical History





- General


Information source: Patient


Last Menstrual Period: sept





- Social History


Smoking Status: Current Every Day Smoker


Cigarette use (# per day): Yes


Frequency of alcohol use: None


Drug Abuse: None


Occupation: nursing home


Lives with: Family


Family History: CAD, Hypertension, Malignancy, Thyroid Disfunction, Other


Pulmonary Medical History: Reports: Hx Asthma


Neurological Medical History: Reports: Hx Migraine


Endocrine Medical History: Denies: Hx Diabetes Mellitus Type 1, Hx Diabetes Alicia

itus Type 2


Renal/ Medical History: Denies: Hx Peritoneal Dialysis


Malignancy Medical History: Reports: Hx Cervical Cancer - pre-cancerous cells 

identified on pap 3 years ago, did not follow up- HPV


GI Medical History: Reports: Hx Gastroesophageal Reflux Disease


Musculoskeletal Medical History: Reports Hx Musculoskeletal Deformity


Past Surgical History: Reports: Hx Cholecystectomy, Hx Herniorrhaphy, Hx Oral 

Surgery - wisdom teeth





- Immunizations


Immunizations up to date: Yes


Hx Diphtheria, Pertussis, Tetanus Vaccination: Yes





Vertical Provider Document





- CONSTITUTIONAL


Agree With Documented VS: Yes


Exam Limitations: No Limitations


General Appearance: WD/WN, No Apparent Distress - nontoxic looking





- INFECTION CONTROL


TRAVEL OUTSIDE OF THE [place] IN LAST 30 DAYS: No





- HEENT


HEENT: Atraumatic, Normocephalic.  negative: Conjuctival Injection, Pharyngeal 

Erythema





- NECK


Neck: Normal Inspection, Supple.  negative: Lymphadenopathy-Left, 

Lymphadenopathy-Right





- RESPIRATORY


Respiratory: No Respiratory Distress, Rhonchi, Wheezing





- CARDIOVASCULAR


Cardiovascular: Tachycardia





- GI/ABDOMEN


Gastrointestinal: Abdomen Soft, Abdomen Non-Tender





- MUSCULOSKELETAL/EXTREMETIES


Musculoskeletal/Extremeties: MAEW, FROM





- NEURO


Level of Consciousness: Awake, Alert, Appropriate





- DERM


Integumentary: Warm, Dry





Course





- Re-evaluation


Re-evalutation: 





10/20/19 11:10


29-year-old female with history of asthma presents with productive cough and 

fever of 103.  Respiratory rate even unlabored patient sounds tight rhonchi will

treat with a DuoNeb and obtain a chest x-ray.  Flu test obtained.


10/20/19 12:36


Patient is positive for flu a and B.  Respiratory rate even unlabored.  Chest x-

ray negative for pneumonia.  She received 2 neb treatments here as well as 

prednisone.  She was prescribed for baloxavir, inhaler and steroids.  She was 

also instructed on good handwashing push fluids follow-up with the primary care 

provider protect her kids.  She verbalized understand all instructions








Dictation of this chart was performed using voice recognition software; 

therefore, there may be some unintended grammatical errors.


10/20/19 12:36


                                        





Chest X-Ray  10/20/19 11:07


IMPRESSION:  NO ACUTE RADIOGRAPHIC FINDING IN THE CHEST.


 








 





- Vital Signs


Vital signs: 


                                        











Temp Pulse Resp BP Pulse Ox


 


 98.5 F   117 H  18   148/92 H  95 


 


 10/20/19 10:23  10/20/19 10:23  10/20/19 10:23  10/20/19 10:23  10/20/19 10:23














- Diagnostic Test


Radiology reviewed: Image reviewed, Reports reviewed





Discharge





- Discharge


Clinical Impression: 


 Cough, influenza a & b





Condition: Stable


Disposition: HOME, SELF-CARE


Instructions:  Antinausea Medication ([facility]), Bronchodilators ([facility]), Influenza 

([facility]), Steroid Medication


Additional Instructions: 


*You have been evaluated for cough, fever, influenza a and B


Your chest x-ray was negative for pneumonia


*Increase fluid intake as discussed


Good handwashing cover your cough


*Take medication as prescribed


*Take ibuprofen as indicated for aches and pains


*Use your inhaler as prescribed


*Monitor your temperature, take Tylenol as indicated


*Follow up with a primary care provider within one week


*Return to ED for worsening condition, changes, needs, trouble breathing, 

worsening symptoms








Monitor your blood pressure. Your blood pressure was elevated today.  This may 

be because you were anxious, in pain or because you need medication.  It is 

important to follow up with your primary care provider for full evaluation.


Prescriptions: 


Prednisone [Deltasone 10 mg Tablet] 10 mg PO ASDIR PRN #15 tablet


 PRN Reason: 


Baloxavir Marboxil [Xofluza] 80 mg PO ONCE PRN #2 tablet


 PRN Reason: 


Forms:  Elevated Blood Pressure, Smoking Cessation Education, Return to Work


Referrals: 


MARY ANN LARA MD [Primary Care Provider] - Follow up in 1 week

## 2019-10-20 NOTE — RADIOLOGY REPORT (SQ)
EXAM DESCRIPTION:  CHEST 2 VIEWS



COMPLETED DATE/TIME:  10/20/2019 11:36 am



REASON FOR STUDY:  cough fever



COMPARISON:  Chest films 6/13/2019, 3/19/2019



EXAM PARAMETERS:  NUMBER OF VIEWS: two views

TECHNIQUE: Digital Frontal and Lateral radiographic views of the chest acquired.

RADIATION DOSE: NA

LIMITATIONS: none



FINDINGS:  LUNGS AND PLEURA: No opacities, masses or pneumothorax. No pleural effusion.

MEDIASTINUM AND HILAR STRUCTURES: No masses or contour abnormalities.

HEART AND VASCULAR STRUCTURES: Heart normal size.  No evidence for failure.

BONES: No acute findings.

HARDWARE: None in the chest.

OTHER: No other significant finding.



IMPRESSION:  NO ACUTE RADIOGRAPHIC FINDING IN THE CHEST.



TECHNICAL DOCUMENTATION:  JOB ID:  [id number]

 2011 [employer]- All Rights Reserved



Reading location - IP[facility]station name: [email or web address]

## 2020-06-10 ENCOUNTER — HOSPITAL ENCOUNTER (EMERGENCY)
Dept: HOSPITAL 62 - ER | Age: 30
LOS: 1 days | Discharge: LEFT BEFORE DISCHARGE | End: 2020-06-11
Payer: COMMERCIAL

## 2020-06-10 VITALS — DIASTOLIC BLOOD PRESSURE: 75 MMHG | SYSTOLIC BLOOD PRESSURE: 116 MMHG

## 2020-06-10 DIAGNOSIS — R06.02: ICD-10-CM

## 2020-06-10 DIAGNOSIS — Z53.21: Primary | ICD-10-CM

## 2020-06-10 DIAGNOSIS — R11.0: ICD-10-CM

## 2020-06-10 DIAGNOSIS — R50.9: ICD-10-CM

## 2020-06-12 ENCOUNTER — HOSPITAL ENCOUNTER (OUTPATIENT)
Dept: HOSPITAL 62 - RDC | Age: 30
End: 2020-06-12
Attending: NURSE PRACTITIONER
Payer: COMMERCIAL

## 2020-06-12 VITALS — SYSTOLIC BLOOD PRESSURE: 107 MMHG | DIASTOLIC BLOOD PRESSURE: 77 MMHG

## 2020-06-12 DIAGNOSIS — Z85.41: ICD-10-CM

## 2020-06-12 DIAGNOSIS — R11.0: ICD-10-CM

## 2020-06-12 DIAGNOSIS — R19.7: ICD-10-CM

## 2020-06-12 DIAGNOSIS — R50.9: ICD-10-CM

## 2020-06-12 DIAGNOSIS — R05: ICD-10-CM

## 2020-06-12 DIAGNOSIS — J45.909: ICD-10-CM

## 2020-06-12 DIAGNOSIS — J06.9: ICD-10-CM

## 2020-06-12 DIAGNOSIS — R51: ICD-10-CM

## 2020-06-12 DIAGNOSIS — K21.9: ICD-10-CM

## 2020-06-12 DIAGNOSIS — R06.02: ICD-10-CM

## 2020-06-12 DIAGNOSIS — F17.200: ICD-10-CM

## 2020-06-12 DIAGNOSIS — Z20.828: Primary | ICD-10-CM

## 2020-06-12 DIAGNOSIS — R10.9: ICD-10-CM

## 2020-06-12 DIAGNOSIS — M79.10: ICD-10-CM

## 2020-06-12 DIAGNOSIS — J11.1: ICD-10-CM

## 2020-06-12 DIAGNOSIS — R09.89: ICD-10-CM

## 2020-06-12 DIAGNOSIS — J02.9: ICD-10-CM

## 2020-06-12 DIAGNOSIS — I10: ICD-10-CM

## 2020-06-12 LAB
A TYPE INFLUENZA AG: POSITIVE
B INFLUENZA AG: NEGATIVE

## 2020-06-12 PROCEDURE — 99211 OFF/OP EST MAY X REQ PHY/QHP: CPT

## 2020-06-12 PROCEDURE — 87070 CULTURE OTHR SPECIMN AEROBIC: CPT

## 2020-06-12 PROCEDURE — 87804 INFLUENZA ASSAY W/OPTIC: CPT

## 2020-06-12 PROCEDURE — 87880 STREP A ASSAY W/OPTIC: CPT

## 2020-06-12 PROCEDURE — C9803 HOPD COVID-19 SPEC COLLECT: HCPCS

## 2020-06-12 PROCEDURE — 36415 COLL VENOUS BLD VENIPUNCTURE: CPT

## 2020-06-12 PROCEDURE — 99201: CPT

## 2020-06-12 PROCEDURE — 87635 SARS-COV-2 COVID-19 AMP PRB: CPT

## 2020-06-12 NOTE — ER RDC ASSESSMENT REPORT
Intake





- In the Last 14 days


Have you traveled outside [place]?: No


Have you been in close contact with someone CONFIRMED: No


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





- Symptoms


Subjective Fever(Felt feverish): Yes


Chills: Yes


Muscule Aches: Yes


Runny Nose: Yes


Sore Throat: Yes


Cough (New or worsening chronic cough): Yes


Shortness of breath: Yes


Nausea or Vomiting: Yes


Headache: Yes


Abdominal Pain: Yes


Diarrhea(3 or more loose stools in last 24 hours): Yes





- Do you have any of the following


Chronic lung disease: Asthma or emphysema or COPD: Yes


Chronic Lung Disease Comment: 





asthma


Cystic Fibrosis: No


Diabetes: No


High Blood Pressure: Yes


Cardiovascular Disease: Yes


Chronic Kidney Disease: No


Chronic Liver Disease: No


Chronic blood disorder like Sickle Cell Disease: No


Weak immune system due to disease or medication: No


Neurologic condition that limits movement: No


Developmental delay - Moderate to Severe: No


Recent (within past 2 weeks) or current Pregnancy: No


Morbid Obesity (>100 pounds over ideal weight): No





- Objective


Temperature: 98.8 F


Pulse Rate: 101


Respiratory Rate: 18


Blood Pressure: 107/77


O2 Sat by Pulse Oximetry: 97


Objective: 


Given above, testing performed: 
































If Testing Performed:


Test Specimen Type Sent to











General





- General


Information source: Patient


Notes: 





Patient presents to the [facility] for screening for the coronavirus.  Patient reports 

fever chills headache body aches, cough, runny nose with nausea.  Patient 

reports symptoms for the past 5 days.  Patient does work in healthcare.





- HPI


Associated symptoms: Body/muscle aches, Nonproductive cough, Fever, Headache, 

Shortness of breath, Sore throat


Exacerbated by: Denies


Recently seen / treated by doctor: Yes





- Related Data


Allergies/Adverse Reactions: 


                                        





No Known Allergies Allergy (Verified 06/13/19 10:55)


   











Past Medical History





- General


Information source: Patient





- Social History


Smoking Status: Current Every Day Smoker


Occupation: Health care


Family History: CAD, Hypertension, Malignancy, Thyroid Disfunction, Other





- Past Medical History


Cardiac Medical History: Reports: Hx Hypertension


Pulmonary Medical History: Reports: Hx Asthma


Neurological Medical History: Reports: Hx Migraine


Endocrine Medical History: Denies: Hx Diabetes Mellitus Type 1, Hx Diabetes 

Mellitus Type 2


Renal/ Medical History: Denies: Hx Peritoneal Dialysis


Malignancy Medical History: Reports: Hx Cervical Cancer - pre-cancerous cells 

identified on pap 3 years ago, did not follow up- HPV


GI Medical History: Reports: Hx Gastroesophageal Reflux Disease


Musculoskeletal Medical History: Reports Hx Musculoskeletal Deformity


Past Surgical History: Reports: Hx Cholecystectomy, Hx Herniorrhaphy, Hx Oral 

Surgery - wisdom teeth





Physical Exam





- Notes


Notes: 





Full physical exam could not be performed due to covid 19 isolation protocols.





Constitutional: Nontoxic appearance, no acute distress


Eyes: Nonicteric, extraocular movements intact, sclera clear


ENT: Posterior pharynx clear without exudates, no tonsillar hypertrophy


Cardiovascular: Heart rate and rhythm regular, no JVD


Respiratory: Breath sounds clear bilaterally, nonlabored breathing, no use of 

accessory muscles, no tachypnea


Gastrointestinal: Abdomen not distended


Muculoskeletal: Moves all extremities well


Skin: Normal color


Neuro: Awake alert oriented, normal speech


Psych: Normal mood and affect





Diagnostic Results


Laboratory Results: 


The patient was evaluated during the global Covid 19 pandemic, and that 

diagnosis was suspected/considered upon their initial presentation.  Their 

evaluation, treatment and testing was consistent with current guidelines for 

patients who present with complaints or symptoms that may be related to Covid 19

.





Patient presents with upper respiratory symptoms worrisome for possible Covid 

19.  Patient does not have emergency worrying symptoms such as difficulty 

breathing, shortness of breath, chest pain, pressure, confusion or cyanosis.  

Patient appears suitable for discharge as  vital signs are stable and patient is

nontoxic in appearance.  Good return precautions have been discussed with 

patient, patient verbalized understanding and is agreeable with discharge plan 

of care at this time.





Patient Education/Counseling


Counseling/Education: 





Patient was provided with discharge information including:





As a person under investigation for Covid 19, the [place] department of 

Health and Human Services, division of public health advises you to adhere to t

he following guidance until your test results are reported to you.  If your test

result is positive, you will receive additional information from your provider 

and your local health department at that time.





Remain at home until you are cleared by the health provider or public health 

authorities.





Keep a log of visitors to your home, notify any visitors to your home of your 

isolation status.





If you plan to move to a new address or leave the county, notify the local 

health department in your County.





Call your doctor or seek care if you have an urgent medical need.  Before 

seeking medical care, call ahead to get instructions from the provider before 

arriving at the medical office clinic or hospital.  Notify them that you are 

being tested for the virus that causes Covid 19 so that arrangements can be 

made, as necessary, to prevent transmission to others in the healthcare setting.

 Next, notify the local health department in your county.





If a medical emergency arises and you need to call 911, inform the first 

responders that you are being tested for the virus that causes Covid 19.  Next, 

notify the local health department in your county.





RDC Discharge





- Discharge


Clinical Impression: 


 Encounter for screening laboratory testing for COVID-19 virus





Upper respiratory infection


Qualifiers:


 URI type: unspecified URI Qualified Code(s): J06.9 - Acute upper respiratory 

infection, unspecified





Condition: Stable


Disposition: Home; Selfcare

## 2020-07-28 ENCOUNTER — HOSPITAL ENCOUNTER (OUTPATIENT)
Dept: HOSPITAL 62 - RAD | Age: 30
End: 2020-07-28
Attending: PHYSICIAN ASSISTANT
Payer: COMMERCIAL

## 2020-07-28 DIAGNOSIS — M25.551: Primary | ICD-10-CM

## 2020-07-28 DIAGNOSIS — M54.5: ICD-10-CM

## 2020-07-28 PROCEDURE — 72110 X-RAY EXAM L-2 SPINE 4/>VWS: CPT

## 2020-07-28 NOTE — RADIOLOGY REPORT (SQ)
EXAM DESCRIPTION:  HIP RIGHT AP/LATERAL



IMAGES COMPLETED DATE/TIME:  7/28/2020 9:09 am



REASON FOR STUDY:  PAIN IN RT HIP,LBP M25.551  PAIN IN RIGHT HIP M54.5  LOW BACK PAIN



COMPARISON:  6/8/2018



NUMBER OF VIEWS:  Two views.



TECHNIQUE:  AP pelvis and additional frog-leg view of the right hip.



LIMITATIONS:  None.



FINDINGS:  MINERALIZATION: Normal.

RIGHT HIP: No fracture or dislocation.  No worrisome bone lesions. No contour deformity.  No joint sp
ace narrowing.

LEFT HIP: No fracture or dislocation.  No worrisome bone lesions.

PUBIS AND ISCHIUM: No fracture.

PELVIS: No fracture.

SACRUM: No fracture or dislocation. No worrisome bone lesions.

LOWER LUMBAR SPINE: No fracture or dislocation. No worrisome bone lesions.  No significant disc disea
se.

SOFT TISSUES: No findings.

OTHER: No other significant finding.



IMPRESSION:  NEGATIVE STUDY OF THE RIGHT HIP. NO EXPLANATION FOR PAIN.



TECHNICAL DOCUMENTATION:  JOB ID:  [id number]

 2011 [employer]- All Rights Reserved



Reading location - IP/workstation name: [email or web address]

## 2020-07-28 NOTE — RADIOLOGY REPORT (SQ)
EXAM DESCRIPTION:  LUMBAR SPINE COMPLETE



IMAGES COMPLETED DATE/TIME:  7/28/2020 9:09 am



REASON FOR STUDY:  PAIN IN RT HIP,LBP M25.551  PAIN IN RIGHT HIP M54.5  LOW BACK PAIN



COMPARISON:  6/8/2018



NUMBER OF VIEWS:  Five views including obliques.



TECHNIQUE:  AP, lateral, oblique, and sacral radiographic images acquired of the lumbar spine.



LIMITATIONS:  None.



FINDINGS:  MINERALIZATION: Normal.

SEGMENTATION: Normal.  No transitional anatomy.

ALIGNMENT: Normal.

VERTEBRAE: Maintained height.  No fracture or worrisome bone lesion.

DISCS: Preserved height.  No significant osteophytes or end plate irregularity.

POSTERIOR ELEMENTS: Pedicles and facets are intact.  No pars defect or posterior arch defects.

HARDWARE: None in the spine.

PARASPINAL SOFT TISSUES: Normal.

PELVIS: Intact as visualized. No fractures or worrisome bone lesions. SI joints intact.

OTHER: No other significant finding.



IMPRESSION:  NORMAL 5 VIEW LUMBAR SPINE.



TECHNICAL DOCUMENTATION:  JOB ID:  [id number]

 2011 [employer]- All Rights Reserved



Reading location - IP/workstation name: [email or web address]